# Patient Record
Sex: FEMALE | Race: WHITE | NOT HISPANIC OR LATINO | Employment: UNEMPLOYED | URBAN - METROPOLITAN AREA
[De-identification: names, ages, dates, MRNs, and addresses within clinical notes are randomized per-mention and may not be internally consistent; named-entity substitution may affect disease eponyms.]

---

## 2018-01-13 ENCOUNTER — HOSPITAL ENCOUNTER (OUTPATIENT)
Dept: LAB | Facility: MEDICAL CENTER | Age: 33
End: 2018-01-13
Attending: FAMILY MEDICINE
Payer: MEDICAID

## 2018-01-13 LAB
ALBUMIN SERPL BCP-MCNC: 4.6 G/DL (ref 3.2–4.9)
ALBUMIN/GLOB SERPL: 1.5 G/DL
ALP SERPL-CCNC: 57 U/L (ref 30–99)
ALT SERPL-CCNC: 12 U/L (ref 2–50)
ANION GAP SERPL CALC-SCNC: 8 MMOL/L (ref 0–11.9)
APPEARANCE UR: ABNORMAL
AST SERPL-CCNC: 18 U/L (ref 12–45)
BACTERIA #/AREA URNS HPF: NEGATIVE /HPF
BASOPHILS # BLD AUTO: 0.8 % (ref 0–1.8)
BASOPHILS # BLD: 0.08 K/UL (ref 0–0.12)
BILIRUB SERPL-MCNC: 1 MG/DL (ref 0.1–1.5)
BILIRUB UR QL STRIP.AUTO: NEGATIVE
BUN SERPL-MCNC: 12 MG/DL (ref 8–22)
CALCIUM SERPL-MCNC: 9 MG/DL (ref 8.5–10.5)
CHLORIDE SERPL-SCNC: 103 MMOL/L (ref 96–112)
CHOLEST SERPL-MCNC: 172 MG/DL (ref 100–199)
CO2 SERPL-SCNC: 26 MMOL/L (ref 20–33)
COLOR UR: YELLOW
CREAT SERPL-MCNC: 0.58 MG/DL (ref 0.5–1.4)
EOSINOPHIL # BLD AUTO: 0.41 K/UL (ref 0–0.51)
EOSINOPHIL NFR BLD: 4.2 % (ref 0–6.9)
EPI CELLS #/AREA URNS HPF: ABNORMAL /HPF
ERYTHROCYTE [DISTWIDTH] IN BLOOD BY AUTOMATED COUNT: 45.9 FL (ref 35.9–50)
EST. AVERAGE GLUCOSE BLD GHB EST-MCNC: 105 MG/DL
GLOBULIN SER CALC-MCNC: 3 G/DL (ref 1.9–3.5)
GLUCOSE SERPL-MCNC: 71 MG/DL (ref 65–99)
GLUCOSE UR STRIP.AUTO-MCNC: NEGATIVE MG/DL
HBA1C MFR BLD: 5.3 % (ref 0–5.6)
HCT VFR BLD AUTO: 46.2 % (ref 37–47)
HDLC SERPL-MCNC: 58 MG/DL
HGB BLD-MCNC: 15.5 G/DL (ref 12–16)
HYALINE CASTS #/AREA URNS LPF: ABNORMAL /LPF
IMM GRANULOCYTES # BLD AUTO: 0.03 K/UL (ref 0–0.11)
IMM GRANULOCYTES NFR BLD AUTO: 0.3 % (ref 0–0.9)
KETONES UR STRIP.AUTO-MCNC: NEGATIVE MG/DL
LDLC SERPL CALC-MCNC: 96 MG/DL
LEUKOCYTE ESTERASE UR QL STRIP.AUTO: NEGATIVE
LYMPHOCYTES # BLD AUTO: 2.81 K/UL (ref 1–4.8)
LYMPHOCYTES NFR BLD: 28.6 % (ref 22–41)
MCH RBC QN AUTO: 32.6 PG (ref 27–33)
MCHC RBC AUTO-ENTMCNC: 33.5 G/DL (ref 33.6–35)
MCV RBC AUTO: 97.1 FL (ref 81.4–97.8)
MICRO URNS: ABNORMAL
MONOCYTES # BLD AUTO: 0.54 K/UL (ref 0–0.85)
MONOCYTES NFR BLD AUTO: 5.5 % (ref 0–13.4)
MUCOUS THREADS #/AREA URNS HPF: ABNORMAL /HPF
NEUTROPHILS # BLD AUTO: 5.94 K/UL (ref 2–7.15)
NEUTROPHILS NFR BLD: 60.6 % (ref 44–72)
NITRITE UR QL STRIP.AUTO: NEGATIVE
NRBC # BLD AUTO: 0 K/UL
NRBC BLD-RTO: 0 /100 WBC
PH UR STRIP.AUTO: 5.5 [PH]
PLATELET # BLD AUTO: 388 K/UL (ref 164–446)
PMV BLD AUTO: 10.2 FL (ref 9–12.9)
POTASSIUM SERPL-SCNC: 4 MMOL/L (ref 3.6–5.5)
PROT SERPL-MCNC: 7.6 G/DL (ref 6–8.2)
PROT UR QL STRIP: NEGATIVE MG/DL
RBC # BLD AUTO: 4.76 M/UL (ref 4.2–5.4)
RBC # URNS HPF: ABNORMAL /HPF
RBC UR QL AUTO: NEGATIVE
SODIUM SERPL-SCNC: 137 MMOL/L (ref 135–145)
SP GR UR STRIP.AUTO: 1.04
TRIGL SERPL-MCNC: 90 MG/DL (ref 0–149)
TSH SERPL DL<=0.005 MIU/L-ACNC: 3.24 UIU/ML (ref 0.38–5.33)
UROBILINOGEN UR STRIP.AUTO-MCNC: 0.2 MG/DL
WBC # BLD AUTO: 9.8 K/UL (ref 4.8–10.8)
WBC #/AREA URNS HPF: ABNORMAL /HPF
YEAST BUDDING URNS QL: PRESENT /HPF

## 2018-01-13 PROCEDURE — 80053 COMPREHEN METABOLIC PANEL: CPT

## 2018-01-13 PROCEDURE — 36415 COLL VENOUS BLD VENIPUNCTURE: CPT

## 2018-01-13 PROCEDURE — 80061 LIPID PANEL: CPT

## 2018-01-13 PROCEDURE — 85025 COMPLETE CBC W/AUTO DIFF WBC: CPT

## 2018-01-13 PROCEDURE — 83036 HEMOGLOBIN GLYCOSYLATED A1C: CPT

## 2018-01-13 PROCEDURE — 84443 ASSAY THYROID STIM HORMONE: CPT

## 2018-01-13 PROCEDURE — 81001 URINALYSIS AUTO W/SCOPE: CPT

## 2018-01-29 ENCOUNTER — HOSPITAL ENCOUNTER (EMERGENCY)
Facility: MEDICAL CENTER | Age: 33
End: 2018-01-30
Attending: EMERGENCY MEDICINE
Payer: MEDICAID

## 2018-01-29 ENCOUNTER — APPOINTMENT (OUTPATIENT)
Dept: RADIOLOGY | Facility: MEDICAL CENTER | Age: 33
End: 2018-01-29
Attending: EMERGENCY MEDICINE
Payer: MEDICAID

## 2018-01-29 DIAGNOSIS — F19.10 SUBSTANCE ABUSE (HCC): ICD-10-CM
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
## 2018-01-29 DIAGNOSIS — F25.0 SCHIZOAFFECTIVE DISORDER, BIPOLAR TYPE (HCC): ICD-10-CM

## 2018-01-29 DIAGNOSIS — S41.151A DOG BITE OF MULTIPLE SITES OF RIGHT UPPER ARM, INITIAL ENCOUNTER: ICD-10-CM

## 2018-01-29 PROCEDURE — 73060 X-RAY EXAM OF HUMERUS: CPT | Mod: RT

## 2018-01-29 PROCEDURE — 700111 HCHG RX REV CODE 636 W/ 250 OVERRIDE (IP): Performed by: EMERGENCY MEDICINE

## 2018-01-29 PROCEDURE — 304217 HCHG IRRIGATION SYSTEM

## 2018-01-29 PROCEDURE — A9270 NON-COVERED ITEM OR SERVICE: HCPCS | Performed by: EMERGENCY MEDICINE

## 2018-01-29 PROCEDURE — 700102 HCHG RX REV CODE 250 W/ 637 OVERRIDE(OP): Performed by: EMERGENCY MEDICINE

## 2018-01-29 PROCEDURE — 90715 TDAP VACCINE 7 YRS/> IM: CPT | Performed by: EMERGENCY MEDICINE

## 2018-01-29 PROCEDURE — 90471 IMMUNIZATION ADMIN: CPT

## 2018-01-29 PROCEDURE — 99284 EMERGENCY DEPT VISIT MOD MDM: CPT

## 2018-01-29 RX ORDER — AMOXICILLIN AND CLAVULANATE POTASSIUM 875; 125 MG/1; MG/1
1 TABLET, FILM COATED ORAL 2 TIMES DAILY
Qty: 20 TAB | Refills: 0 | Status: SHIPPED | OUTPATIENT
Start: 2018-01-29 | End: 2018-02-08

## 2018-01-29 RX ORDER — IBUPROFEN 600 MG/1
600 TABLET ORAL ONCE
Status: COMPLETED | OUTPATIENT
Start: 2018-01-29 | End: 2018-01-29

## 2018-01-29 RX ORDER — AMOXICILLIN AND CLAVULANATE POTASSIUM 875; 125 MG/1; MG/1
1 TABLET, FILM COATED ORAL ONCE
Status: COMPLETED | OUTPATIENT
Start: 2018-01-29 | End: 2018-01-29

## 2018-01-29 RX ADMIN — IBUPROFEN 600 MG: 600 TABLET, FILM COATED ORAL at 22:08

## 2018-01-29 RX ADMIN — AMOXICILLIN AND CLAVULANATE POTASSIUM 1 TABLET: 875; 125 TABLET, FILM COATED ORAL at 22:08

## 2018-01-29 RX ADMIN — CLOSTRIDIUM TETANI TOXOID ANTIGEN (FORMALDEHYDE INACTIVATED), CORYNEBACTERIUM DIPHTHERIAE TOXOID ANTIGEN (FORMALDEHYDE INACTIVATED), BORDETELLA PERTUSSIS TOXOID ANTIGEN (GLUTARALDEHYDE INACTIVATED), BORDETELLA PERTUSSIS FILAMENTOUS HEMAGGLUTININ ANTIGEN (FORMALDEHYDE INACTIVATED), BORDETELLA PERTUSSIS PERTACTIN ANTIGEN, AND BORDETELLA PERTUSSIS FIMBRIAE 2/3 ANTIGEN 0.5 ML: 5; 2; 2.5; 5; 3; 5 INJECTION, SUSPENSION INTRAMUSCULAR at 22:07

## 2018-01-30 VITALS
TEMPERATURE: 98.2 F | SYSTOLIC BLOOD PRESSURE: 132 MMHG | WEIGHT: 195 LBS | HEIGHT: 64 IN | HEART RATE: 105 BPM | OXYGEN SATURATION: 97 % | RESPIRATION RATE: 18 BRPM | DIASTOLIC BLOOD PRESSURE: 70 MMHG | BODY MASS INDEX: 33.29 KG/M2

## 2018-01-30 PROCEDURE — 99284 EMERGENCY DEPT VISIT MOD MDM: CPT
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
## 2018-01-30 NOTE — PROGRESS NOTES
Discharge orders received from ERP. No new prescriptions received. Provided education and patient demonstrated understanding. Pt ambulatory off unit. All personal belongings with patient. Pt under custody of [facility].

## 2018-01-30 NOTE — DISCHARGE INSTRUCTIONS
Cleared for incarceration.     Please follow up with a primary physician for blood pressure management, diabetic screening, and all other preventive health concerns.     You were seen and evaluated in the Emergency Department at [facility] for:     Dog bites to arm    You had the following tests and studies:    Xray shows no broken bones or retained dog teeth. Doppler exam showed no evidence of serious blood vessel injury    You received the following medications:    Augmentin, tetanus update, ibuprofen    You received the following prescriptions:    Augmentin, take this until finished or your arm WILL get infected and you'll get very sick.   ----------------------------    Please make sure to follow up with:    A primary care doctor for a recheck in 1-2 days. Return to the ER if the swelling, redness, pain get any worse, or if you get fevers, vomiting, or any new or worse symptoms.     Good luck!  ----------------------------    We always encourage patients to return IMMEDIATELY if they have:  Increased or changing pain, passing out, fevers over 100.4 (taken in your mouth or rectally) for more than 2 days, redness or swelling of skin or tissues, feeling like your heart is beating fast, chest pain that is new or worsening, trouble breathing, feeling like your throat is closing up and can not breath, inability to walk, weakness of any part of your body, new dizziness, severe bleeding that won't stop from any part of your body, if you can't eat or drink, or if you have any other concerns.   If you feel worse, please know that you can always return with any questions, concerns, worse symptoms, or you are feeling unsafe. We certainly cannot say for sure that we have ruled out every illness or dangerous disease, but we feel that at this specific time, your exam, tests, and vital signs like heart rate and blood pressure are safe for discharge.       Animal Bite  An animal bite can result in a scratch on the  skin, deep open cut, puncture of the skin, crush injury, or tearing away of the skin or a body part. Dogs are responsible for most animal bites. Children are bitten more often than adults. An animal bite can range from very mild to more serious. A small bite from your house pet is no cause for alarm. However, some animal bites can become infected or injure a bone or other tissue. You must seek medical care if:  · The skin is broken and bleeding does not slow down or stop after 15 minutes.  · The puncture is deep and difficult to clean (such as a cat bite).  · Pain, warmth, redness, or pus develops around the wound.  · The bite is from a stray animal or rodent. There may be a risk of rabies infection.  · The bite is from a snake, raccoon, skunk, rodriguez, coyote, or bat. There may be a risk of rabies infection.  · The person bitten has a chronic illness such as diabetes, liver disease, or cancer, or the person takes medicine that lowers the immune system.  · There is concern about the location and severity of the bite.  It is important to clean and protect an animal bite wound right away to prevent infection. Follow these steps:  · Clean the wound with plenty of water and soap.  · Apply an antibiotic cream.  · Apply gentle pressure over the wound with a clean towel or gauze to slow or stop bleeding.  · Elevate the affected area above the heart to help stop any bleeding.  · Seek medical care. Getting medical care within 8 hours of the animal bite leads to the best possible outcome.  DIAGNOSIS   Your caregiver will most likely:  · Take a detailed history of the animal and the bite injury.  · Perform a wound exam.  · Take your medical history.  Blood tests or X-rays may be performed. Sometimes, infected bite wounds are cultured and sent to a lab to identify the infectious bacteria.   TREATMENT   Medical treatment will depend on the location and type of animal bite as well as the patient's medical history. Treatment may  include:  · Wound care, such as cleaning and flushing the wound with saline solution, bandaging, and elevating the affected area.  · Antibiotics.  · Tetanus immunization.  · Rabies immunization.  · Leaving the wound open to heal. This is often done with animal bites, due to the high risk of infection. However, in certain cases, wound closure with stitches, wound adhesive, skin adhesive strips, or staples may be used.   Infected bites that are left untreated may require intravenous (IV) antibiotics and surgical treatment in the hospital.  HOME CARE INSTRUCTIONS  · Follow your caregiver's instructions for wound care.  · Take all medicines as directed.  · If your caregiver prescribes antibiotics, take them as directed. Finish them even if you start to feel better.  · Follow up with your caregiver for further exams or immunizations as directed.  You may need a tetanus shot if:  · You cannot remember when you had your last tetanus shot.  · You have never had a tetanus shot.  · The injury broke your skin.  If you get a tetanus shot, your arm may swell, get red, and feel warm to the touch. This is common and not a problem. If you need a tetanus shot and you choose not to have one, there is a rare chance of getting tetanus. Sickness from tetanus can be serious.  SEEK MEDICAL CARE IF:  · You notice warmth, redness, soreness, swelling, pus discharge, or a bad smell coming from the wound.  · You have a red line on the skin coming from the wound.  · You have a fever, chills, or a general ill feeling.  · You have nausea or vomiting.  · You have continued or worsening pain.  · You have trouble moving the injured part.  · You have other questions or concerns.  MAKE SURE YOU:  · Understand these instructions.  · Will watch your condition.  · Will get help right away if you are not doing well or get worse.     This information is not intended to replace advice given to you by your health care provider. Make sure you discuss any  questions you have with your health care provider.     Document Released: 09/04/2012 Document Revised: 03/11/2013 Document Reviewed: 09/04/2012  [employer] Patient Education ©2016 [employer]    Drug Abuse, Frequently Asked Questions  Drug addiction is a complex brain disease. It is characterized by compulsive, at times uncontrollable, drug craving, seeking, and use that persists even in the face of extremely negative results. Drug seeking becomes compulsive, in large part as a result of the effects of prolonged drug use on brain functioning and, thus, on behavior. For many people, drug addiction becomes chronic, with relapses possible even after long periods of being off the drug.  HOW QUICKLY CAN I BECOME ADDICTED TO A DRUG?  There is no easy answer to this. If and how quickly you might become addicted to a drug depends on many factors including the biology of your body. All drugs are potentially harmful and may have life-threatening consequences associated with their use. There are also vast differences among individuals in sensitivity to various drugs. While one person may use a drug many times and suffer no ill effects, another person may be particularly vulnerable and overdose or developing a craving with the first use. There is no way of knowing in advance how someone may react.  HOW DO I KNOW IF SOMEONE IS ADDICTED TO DRUGS?  If a person is compulsively seeking and using a drug despite negative consequences (such as loss of job, debt, physical problems brought on by drug abuse, or family problems) then he or she is probably addicted. Those who screen for drug problems, such as physicians, have developed the CAGE questionnaire. These four simple questions can help detect substance abuse problems:  · Have you ever felt you ought to Cut down on your drinking/drug use?   · Have people ever Annoyed you by criticizing your drinking/drug use?   · Have you ever felt bad or Guilty about your drinking/drug  "use?   · Have you ever had a drink or taken a drug first thing in the morning to steady your nerves or get rid of a hangover ([employer]-opener)?   WHAT ARE THE PHYSICAL SIGNS OF ABUSE OR ADDICTION?  The physical signs of abuse or addiction can vary depending on the person and the drug being abused. For example, someone who abuses marijuana may have a chronic cough or worsening of asthmatic conditions. THC, the chemical in marijuana responsible for producing its effects, is associated with weakening the immune system which makes the user more vulnerable to infections, such as pneumonia. Each drug has short-term and long-term physical effects. Stimulants like cocaine increase heart rate and blood pressure, whereas opioids like heroin may slow the heart rate and reduce breathing (respiration).   ARE THERE EFFECTIVE TREATMENTS FOR DRUG ADDICTION?  Drug addiction can be effectively treated with behavioral-based therapies and, for addiction to some drugs such as heroin or nicotine, medications may be used. Treatment may vary for each person depending on the type of drug(s) being used and multiple courses of treatment may be needed to achieve success. Research has revealed 13 basic principles that underlie effective drug addiction treatment. These are discussed in [employer]'s Principles of Drug Addiction Treatment: A Research-Based Guide.  WHERE CAN I FIND INFORMATION ABOUT DRUG TREATMENT PROGRAMS?  · For referrals to treatment programs, visit the Substance Abuse and Mental Health Services Administration online at http://findtreatment.samhsa.gov/.   · [employer] publishes an expanding series of treatment manuals, the \"clinical toolbox,\" that gives drug treatment providers research-based information for creating effective treatment programs.   WHAT IS DETOXIFICATION, OR \"DETOX\"?  Detoxification is the process of allowing the body to rid itself of a drug while managing the symptoms of withdrawal. It is often the first step in a drug " treatment program and should be followed by treatment with a behavioral-based therapy and/or a medication, if available. Detox alone with no follow-up is not treatment.   WHAT IS WITHDRAWAL? HOW LONG DOES IT LAST?  Withdrawal is the variety of symptoms that occur after use of some addictive drugs is reduced or stopped. Length of withdrawal and symptoms vary with the type of drug. For example, physical symptoms of heroin withdrawal may include restlessness, muscle and bone pain, insomnia, diarrhea, vomiting, and cold flashes. These physical symptoms may last for several days, but the general depression, or dysphoria (opposite of euphoria), that often accompanies heroin withdrawal, may last for weeks. In many cases withdrawal can be easily treated with medications to ease the symptoms. But treating withdrawal is not the same as treating addiction.   WHAT ARE THE COSTS OF DRUG ABUSE TO SOCIETY?  Beyond the raw numbers are other costs to society:  · Spread of infectious diseases such as HIV/AIDS and hepatitis C either through sharing of drug paraphernalia or unprotected sex.   · Deaths due to overdose or other complications from drug use.   · Effects on unborn children of pregnant drug users.   · Other effects such as crime and homelessness.   IF A PREGNANT WOMAN ABUSES DRUGS, DOES IT AFFECT THE FETUS?  · Many substances including alcohol, nicotine, and drugs of abuse can have negative effects on the developing fetus because they are transferred to the fetus across the placenta. For example, nicotine has been connected with premature birth and low birth weight, as has the use of cocaine. Scientific studies have shown that babies born to marijuana users were shorter, weighed less, and had smaller head sizes than those born to mothers who did not use the drug. Smaller babies are more likely to develop health problems.   · Whether a baby's health problems, if caused by a drug, will continue as the child grows, is not  always known. Research does show that children born to mothers who used marijuana regularly during pregnancy may have trouble concentrating, when older. Our research continues to produce insights on the negative effects of drug use on the fetus.   Document Released: 12/20/2004 Document Revised: 03/11/2013 Document Reviewed: 03/19/2010  [employer]® Patient Information ©2013 [employer].

## 2018-07-06 ENCOUNTER — HOSPITAL ENCOUNTER (OUTPATIENT)
Facility: MEDICAL CENTER | Age: 33
End: 2018-07-06
Attending: STUDENT IN AN ORGANIZED HEALTH CARE EDUCATION/TRAINING PROGRAM
Payer: MEDICAID

## 2018-07-06 ENCOUNTER — OFFICE VISIT (OUTPATIENT)
Dept: INTERNAL MEDICINE | Facility: MEDICAL CENTER | Age: 33
End: 2018-07-06
Payer: MEDICAID

## 2018-07-06 ENCOUNTER — HOSPITAL ENCOUNTER (OUTPATIENT)
Dept: LAB | Facility: MEDICAL CENTER | Age: 33
End: 2018-07-06
Attending: STUDENT IN AN ORGANIZED HEALTH CARE EDUCATION/TRAINING PROGRAM
Payer: MEDICAID

## 2018-07-06 ENCOUNTER — TELEPHONE (OUTPATIENT)
Dept: INTERNAL MEDICINE | Facility: MEDICAL CENTER | Age: 33
End: 2018-07-06

## 2018-07-06 VITALS
TEMPERATURE: 98.2 F | DIASTOLIC BLOOD PRESSURE: 86 MMHG | OXYGEN SATURATION: 98 % | BODY MASS INDEX: 30.25 KG/M2 | WEIGHT: 177.2 LBS | HEIGHT: 64 IN | SYSTOLIC BLOOD PRESSURE: 110 MMHG | HEART RATE: 91 BPM

## 2018-07-06 DIAGNOSIS — N30.00 ACUTE CYSTITIS WITHOUT HEMATURIA: ICD-10-CM

## 2018-07-06 LAB
APPEARANCE UR: NORMAL
BILIRUB UR STRIP-MCNC: NORMAL MG/DL
COLOR UR AUTO: NORMAL
GLUCOSE UR STRIP.AUTO-MCNC: NORMAL MG/DL
KETONES UR STRIP.AUTO-MCNC: NORMAL MG/DL
LEUKOCYTE ESTERASE UR QL STRIP.AUTO: NORMAL
NITRITE UR QL STRIP.AUTO: NORMAL
PH UR STRIP.AUTO: 7 [PH] (ref 5–8)
PROT UR QL STRIP: NORMAL MG/DL
RBC UR QL AUTO: NORMAL
SP GR UR STRIP.AUTO: 1.01
UROBILINOGEN UR STRIP-MCNC: NORMAL MG/DL

## 2018-07-06 PROCEDURE — 87591 N.GONORRHOEAE DNA AMP PROB: CPT | Mod: 91

## 2018-07-06 PROCEDURE — 81002 URINALYSIS NONAUTO W/O SCOPE: CPT | Mod: GC | Performed by: INTERNAL MEDICINE

## 2018-07-06 PROCEDURE — 87591 N.GONORRHOEAE DNA AMP PROB: CPT

## 2018-07-06 PROCEDURE — 99000 SPECIMEN HANDLING OFFICE-LAB: CPT | Performed by: INTERNAL MEDICINE

## 2018-07-06 PROCEDURE — 99204 OFFICE O/P NEW MOD 45 MIN: CPT | Mod: GC | Performed by: INTERNAL MEDICINE

## 2018-07-06 PROCEDURE — 87491 CHLMYD TRACH DNA AMP PROBE: CPT | Mod: 91

## 2018-07-06 PROCEDURE — 87491 CHLMYD TRACH DNA AMP PROBE: CPT

## 2018-07-06 RX ORDER — NAPROXEN 25 MG/ML
125 SUSPENSION ORAL 2 TIMES DAILY
Qty: 120 ML | Refills: 1 | Status: SHIPPED
Start: 2018-07-06 | End: 2018-07-08

## 2018-07-06 RX ORDER — ALPRAZOLAM 0.5 MG/1
0.5 TABLET ORAL
COMMUNITY
Start: 2018-04-26 | End: 2023-07-07

## 2018-07-06 RX ORDER — DEXTROAMPHETAMINE SACCHARATE, AMPHETAMINE ASPARTATE, DEXTROAMPHETAMINE SULFATE AND AMPHETAMINE SULFATE 2.5; 2.5; 2.5; 2.5 MG/1; MG/1; MG/1; MG/1
TABLET ORAL
Refills: 0 | COMMUNITY
Start: 2018-06-29 | End: 2023-07-07

## 2018-07-06 RX ORDER — PHENAZOPYRIDINE HYDROCHLORIDE 200 MG/1
200 TABLET, FILM COATED ORAL 3 TIMES DAILY PRN
Qty: 6 TAB | Refills: 0 | Status: SHIPPED
Start: 2018-07-06 | End: 2018-07-09 | Stop reason: SDUPTHER

## 2018-07-06 RX ORDER — ALBUTEROL SULFATE 90 UG/1
2 AEROSOL, METERED RESPIRATORY (INHALATION) EVERY 6 HOURS PRN
Qty: 8.5 G | Refills: 3 | Status: SHIPPED
Start: 2018-07-06 | End: 2023-02-18

## 2018-07-06 RX ORDER — BUPROPION HYDROCHLORIDE 150 MG/1
150 TABLET ORAL EVERY MORNING
Refills: 1 | COMMUNITY
Start: 2018-06-13 | End: 2023-07-07

## 2018-07-06 RX ORDER — ALBUTEROL SULFATE 90 UG/1
2 AEROSOL, METERED RESPIRATORY (INHALATION) EVERY 6 HOURS PRN
COMMUNITY
End: 2018-07-06 | Stop reason: SDUPTHER

## 2018-07-06 RX ORDER — METOCLOPRAMIDE 5 MG/1
5 TABLET ORAL 4 TIMES DAILY
Qty: 120 TAB | Refills: 1 | Status: SHIPPED
Start: 2018-07-06 | End: 2018-07-08

## 2018-07-06 ASSESSMENT — ENCOUNTER SYMPTOMS
DOUBLE VISION: 1
PALPITATIONS: 0
VOMITING: 0
SHORTNESS OF BREATH: 0
HALLUCINATIONS: 0
SORE THROAT: 0
NAUSEA: 0
CONSTIPATION: 0
MYALGIAS: 0
BLOOD IN STOOL: 0
DIAPHORESIS: 0
HEMOPTYSIS: 0
FEVER: 0
WHEEZING: 0
WEAKNESS: 0
CHILLS: 0
ABDOMINAL PAIN: 0
FALLS: 0
NECK PAIN: 0
HEARTBURN: 0
DIARRHEA: 0
EYE DISCHARGE: 0
WEIGHT LOSS: 0
SPUTUM PRODUCTION: 0
EYE PAIN: 0
NERVOUS/ANXIOUS: 1
EYE REDNESS: 0
PHOTOPHOBIA: 0
FLANK PAIN: 0
SINUS PAIN: 0
ORTHOPNEA: 0
CLAUDICATION: 0
PND: 0
BLURRED VISION: 1
BACK PAIN: 0
STRIDOR: 0
COUGH: 0

## 2018-07-06 ASSESSMENT — PATIENT HEALTH QUESTIONNAIRE - PHQ9
5. POOR APPETITE OR OVEREATING: 3 - NEARLY EVERY DAY
CLINICAL INTERPRETATION OF PHQ2 SCORE: 4
SUM OF ALL RESPONSES TO PHQ QUESTIONS 1-9: 18

## 2018-07-06 NOTE — TELEPHONE ENCOUNTER
Pt walked into clinic was given rx for naproxen liquid form pt asking to change to tablet form verbal order from  to call Naproxen 500mg 1 tab po Bid into pharmacy.Rx called in pt was notified.Please update pr's chart
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
## 2018-07-06 NOTE — PATIENT INSTRUCTIONS
Avoid sexual intercourse until the end of treatment      Urinary Tract Infection, Adult  Introduction  A urinary tract infection (UTI) is an infection of any part of the urinary tract. The urinary tract includes the:  · Kidneys.  · Ureters.  · Bladder.  · Urethra.  These organs make, store, and get rid of pee (urine) in the body.  Follow these instructions at home:  · Take over-the-counter and prescription medicines only as told by your doctor.  · If you were prescribed an antibiotic medicine, take it as told by your doctor. Do not stop taking the antibiotic even if you start to feel better.  · Avoid the following drinks:  ¨ Alcohol.  ¨ Caffeine.  ¨ Tea.  ¨ Carbonated drinks.  · Drink enough fluid to keep your pee clear or pale yellow.  · Keep all follow-up visits as told by your doctor. This is important.  · Make sure to:  ¨ Empty your bladder often and completely. Do not to hold pee for long periods of time.  ¨ Empty your bladder before and after sex.  ¨ Wipe from front to back after a bowel movement if you are female. Use each tissue one time when you wipe.  Contact a doctor if:  · You have back pain.  · You have a fever.  · You feel sick to your stomach (nauseous).  · You throw up (vomit).  · Your symptoms do not get better after 3 days.  · Your symptoms go away and then come back.  Get help right away if:  · You have very bad back pain.  · You have very bad lower belly (abdominal) pain.  · You are throwing up and cannot keep down any medicines or water.  This information is not intended to replace advice given to you by your health care provider. Make sure you discuss any questions you have with your health care provider.  Document Released: 06/05/2009 Document Revised: 05/25/2017 Document Reviewed: 11/07/2016  © 2017 [employer]vier

## 2018-07-06 NOTE — PROGRESS NOTES
Pending urine culture results. Adjust medication depending on the sensitivity of the organism.    Either Dr. Franco or Dr. Quarles see the pt on Monday 7/9/2018. I've sent both of them an E-mail regarding the pt.

## 2018-07-07 LAB
C TRACH DNA SPEC QL NAA+PROBE: NEGATIVE
N GONORRHOEA DNA SPEC QL NAA+PROBE: NEGATIVE
SPECIMEN SOURCE: NORMAL

## 2018-07-08 ENCOUNTER — APPOINTMENT (OUTPATIENT)
Dept: RADIOLOGY | Facility: MEDICAL CENTER | Age: 33
End: 2018-07-08
Attending: EMERGENCY MEDICINE
Payer: MEDICAID

## 2018-07-08 ENCOUNTER — HOSPITAL ENCOUNTER (EMERGENCY)
Facility: MEDICAL CENTER | Age: 33
End: 2018-07-08
Attending: EMERGENCY MEDICINE
Payer: MEDICAID

## 2018-07-08 VITALS
WEIGHT: 180.78 LBS | BODY MASS INDEX: 32.03 KG/M2 | HEART RATE: 88 BPM | SYSTOLIC BLOOD PRESSURE: 112 MMHG | OXYGEN SATURATION: 99 % | DIASTOLIC BLOOD PRESSURE: 71 MMHG | HEIGHT: 63 IN | RESPIRATION RATE: 18 BRPM | TEMPERATURE: 98 F

## 2018-07-08 DIAGNOSIS — N12 PYELONEPHRITIS: ICD-10-CM

## 2018-07-08 LAB
ALBUMIN SERPL BCP-MCNC: 4.8 G/DL (ref 3.2–4.9)
ALBUMIN/GLOB SERPL: 1.8 G/DL
ALP SERPL-CCNC: 59 U/L (ref 30–99)
ALT SERPL-CCNC: 18 U/L (ref 2–50)
ANION GAP SERPL CALC-SCNC: 6 MMOL/L (ref 0–11.9)
APPEARANCE UR: CLEAR
AST SERPL-CCNC: 18 U/L (ref 12–45)
BACTERIA #/AREA URNS HPF: ABNORMAL /HPF
BASOPHILS # BLD AUTO: 0.3 % (ref 0–1.8)
BASOPHILS # BLD: 0.03 K/UL (ref 0–0.12)
BILIRUB SERPL-MCNC: 0.8 MG/DL (ref 0.1–1.5)
BILIRUB UR QL STRIP.AUTO: NORMAL
BUN SERPL-MCNC: 8 MG/DL (ref 8–22)
CALCIUM SERPL-MCNC: 9.1 MG/DL (ref 8.4–10.2)
CHLORIDE SERPL-SCNC: 106 MMOL/L (ref 96–112)
CO2 SERPL-SCNC: 27 MMOL/L (ref 20–33)
COLOR UR: NORMAL
CREAT SERPL-MCNC: 0.68 MG/DL (ref 0.5–1.4)
EOSINOPHIL # BLD AUTO: 0.01 K/UL (ref 0–0.51)
EOSINOPHIL NFR BLD: 0.1 % (ref 0–6.9)
EPI CELLS #/AREA URNS HPF: ABNORMAL /HPF
ERYTHROCYTE [DISTWIDTH] IN BLOOD BY AUTOMATED COUNT: 45 FL (ref 35.9–50)
GLOBULIN SER CALC-MCNC: 2.6 G/DL (ref 1.9–3.5)
GLUCOSE SERPL-MCNC: 78 MG/DL (ref 65–99)
GLUCOSE UR STRIP.AUTO-MCNC: NORMAL MG/DL
HCG UR QL: NEGATIVE
HCT VFR BLD AUTO: 43.5 % (ref 37–47)
HGB BLD-MCNC: 14.5 G/DL (ref 12–16)
IMM GRANULOCYTES # BLD AUTO: 0.04 K/UL (ref 0–0.11)
IMM GRANULOCYTES NFR BLD AUTO: 0.4 % (ref 0–0.9)
KETONES UR STRIP.AUTO-MCNC: NORMAL MG/DL
LEUKOCYTE ESTERASE UR QL STRIP.AUTO: NORMAL
LYMPHOCYTES # BLD AUTO: 2.59 K/UL (ref 1–4.8)
LYMPHOCYTES NFR BLD: 22.7 % (ref 22–41)
MCH RBC QN AUTO: 32.7 PG (ref 27–33)
MCHC RBC AUTO-ENTMCNC: 33.3 G/DL (ref 33.6–35)
MCV RBC AUTO: 98.2 FL (ref 81.4–97.8)
MICRO URNS: NORMAL
MONOCYTES # BLD AUTO: 0.66 K/UL (ref 0–0.85)
MONOCYTES NFR BLD AUTO: 5.8 % (ref 0–13.4)
MUCOUS THREADS #/AREA URNS HPF: ABNORMAL /HPF
NEUTROPHILS # BLD AUTO: 8.09 K/UL (ref 2–7.15)
NEUTROPHILS NFR BLD: 70.7 % (ref 44–72)
NITRITE UR QL STRIP.AUTO: NORMAL
NRBC # BLD AUTO: 0 K/UL
NRBC BLD-RTO: 0 /100 WBC
PH UR STRIP.AUTO: 7 [PH]
PLATELET # BLD AUTO: 348 K/UL (ref 164–446)
PMV BLD AUTO: 9.3 FL (ref 9–12.9)
POTASSIUM SERPL-SCNC: 3.7 MMOL/L (ref 3.6–5.5)
PROT SERPL-MCNC: 7.4 G/DL (ref 6–8.2)
PROT UR QL STRIP: NORMAL MG/DL
RBC # BLD AUTO: 4.43 M/UL (ref 4.2–5.4)
RBC # URNS HPF: ABNORMAL /HPF
RBC UR QL AUTO: NORMAL
SODIUM SERPL-SCNC: 139 MMOL/L (ref 135–145)
SP GR UR REFRACTOMETRY: 1.02
SP GR UR STRIP.AUTO: 1.02
WBC # BLD AUTO: 11.4 K/UL (ref 4.8–10.8)
WBC #/AREA URNS HPF: ABNORMAL /HPF

## 2018-07-08 PROCEDURE — 700117 HCHG RX CONTRAST REV CODE 255: Performed by: EMERGENCY MEDICINE

## 2018-07-08 PROCEDURE — 96376 TX/PRO/DX INJ SAME DRUG ADON: CPT

## 2018-07-08 PROCEDURE — 99285 EMERGENCY DEPT VISIT HI MDM: CPT

## 2018-07-08 PROCEDURE — 700111 HCHG RX REV CODE 636 W/ 250 OVERRIDE (IP): Performed by: EMERGENCY MEDICINE

## 2018-07-08 PROCEDURE — 87077 CULTURE AEROBIC IDENTIFY: CPT

## 2018-07-08 PROCEDURE — 74177 CT ABD & PELVIS W/CONTRAST: CPT

## 2018-07-08 PROCEDURE — 700105 HCHG RX REV CODE 258: Performed by: EMERGENCY MEDICINE

## 2018-07-08 PROCEDURE — 80053 COMPREHEN METABOLIC PANEL: CPT

## 2018-07-08 PROCEDURE — 87086 URINE CULTURE/COLONY COUNT: CPT

## 2018-07-08 PROCEDURE — 36415 COLL VENOUS BLD VENIPUNCTURE: CPT

## 2018-07-08 PROCEDURE — 85025 COMPLETE CBC W/AUTO DIFF WBC: CPT

## 2018-07-08 PROCEDURE — 96365 THER/PROPH/DIAG IV INF INIT: CPT

## 2018-07-08 PROCEDURE — 81001 URINALYSIS AUTO W/SCOPE: CPT

## 2018-07-08 PROCEDURE — 87186 SC STD MICRODIL/AGAR DIL: CPT

## 2018-07-08 PROCEDURE — 96375 TX/PRO/DX INJ NEW DRUG ADDON: CPT

## 2018-07-08 PROCEDURE — 81025 URINE PREGNANCY TEST: CPT

## 2018-07-08 RX ORDER — MORPHINE SULFATE 4 MG/ML
4 INJECTION, SOLUTION INTRAMUSCULAR; INTRAVENOUS ONCE
Status: COMPLETED | OUTPATIENT
Start: 2018-07-08 | End: 2018-07-08

## 2018-07-08 RX ORDER — HYDROCODONE BITARTRATE AND ACETAMINOPHEN 5; 325 MG/1; MG/1
1-2 TABLET ORAL EVERY 4 HOURS PRN
Qty: 10 TAB | Refills: 0 | Status: SHIPPED | OUTPATIENT
Start: 2018-07-08 | End: 2018-07-10

## 2018-07-08 RX ORDER — SODIUM CHLORIDE 9 MG/ML
1000 INJECTION, SOLUTION INTRAVENOUS ONCE
Status: COMPLETED | OUTPATIENT
Start: 2018-07-08 | End: 2018-07-08

## 2018-07-08 RX ORDER — CEFDINIR 300 MG/1
300 CAPSULE ORAL 2 TIMES DAILY
Qty: 20 CAP | Refills: 0 | Status: SHIPPED | OUTPATIENT
Start: 2018-07-08 | End: 2019-04-19

## 2018-07-08 RX ORDER — ONDANSETRON 2 MG/ML
4 INJECTION INTRAMUSCULAR; INTRAVENOUS ONCE
Status: COMPLETED | OUTPATIENT
Start: 2018-07-08 | End: 2018-07-08

## 2018-07-08 RX ORDER — NAPROXEN 500 MG/1
500 TABLET ORAL 2 TIMES DAILY WITH MEALS
Status: SHIPPED | COMMUNITY
End: 2023-07-07

## 2018-07-08 RX ORDER — ONDANSETRON 4 MG/1
4 TABLET, ORALLY DISINTEGRATING ORAL EVERY 6 HOURS PRN
Qty: 8 TAB | Refills: 0 | Status: SHIPPED | OUTPATIENT
Start: 2018-07-08 | End: 2019-04-19

## 2018-07-08 RX ORDER — CEFTRIAXONE 1 G/1
1 INJECTION, POWDER, FOR SOLUTION INTRAMUSCULAR; INTRAVENOUS ONCE
Status: COMPLETED | OUTPATIENT
Start: 2018-07-08 | End: 2018-07-08

## 2018-07-08 RX ADMIN — MORPHINE SULFATE 4 MG: 4 INJECTION INTRAVENOUS at 16:03

## 2018-07-08 RX ADMIN — ONDANSETRON 4 MG: 2 INJECTION, SOLUTION INTRAMUSCULAR; INTRAVENOUS at 16:03

## 2018-07-08 RX ADMIN — SODIUM CHLORIDE 1000 ML: 9 INJECTION, SOLUTION INTRAVENOUS at 15:34

## 2018-07-08 RX ADMIN — IOHEXOL 100 ML: 350 INJECTION, SOLUTION INTRAVENOUS at 19:28

## 2018-07-08 RX ADMIN — CEFTRIAXONE 1 G: 1 INJECTION, POWDER, FOR SOLUTION INTRAMUSCULAR; INTRAVENOUS at 17:50

## 2018-07-08 RX ADMIN — MORPHINE SULFATE 4 MG: 4 INJECTION INTRAVENOUS at 18:39

## 2018-07-08 RX ADMIN — ONDANSETRON 4 MG: 2 INJECTION, SOLUTION INTRAMUSCULAR; INTRAVENOUS at 18:40

## 2018-07-08 ASSESSMENT — PAIN SCALES - GENERAL: PAINLEVEL_OUTOF10: 3
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
## 2018-07-08 NOTE — ED NOTES
C/O worsening dysuria for the past 2 days, and on antibiotic treatment.  She presents with a dx of UTI established in a Urgent Care.

## 2018-07-08 NOTE — ED NOTES
Pt has had dysuria for about 5 days. Was seen at a PCP on Friday and given abx. Pt still experiencing symptoms and now her LUQ and Left back is painful.

## 2018-07-08 NOTE — ED NOTES
Med Rec completed per patient with medication bottles at bedside  (returned)  Allergies reviewed  No ORAL antibiotics in last 30 days    Patient had a Kenalog shot about 2 weeks ago

## 2018-07-08 NOTE — LETTER
7/11/2018               Yane Orellana  [address] [address]        Dear Yane (MR#[id number])    This letter is sent in regards to your, recent visit to the [facility] Emergency Department on 7/8/2018.  During the visit, tests were performed to assist the physician in a medical diagnosis.  A review of those tests requires that we notify you of the following:    Your urine culture was POSITIVE for a bacteria called Escherichia coli. The antibiotic prescribed for you (cefdinir) should be active to treat this bacteria. IT IS IMPORTANT THAT YOU CONTINUE TAKING YOUR ANTIBIOTIC UNTIL IT IS FINISHED.      Please feel free to contact me at the number below if you have any questions or concerns. Thank you for your cooperation in the matter.    Sincerely,  ED Culture Follow-Up Staff  Mariia Irving, PharmD    [facility], Emergency Department  [address] [place] [id number]  [phone] (ED Culture Line)  [phone]

## 2018-07-09 ENCOUNTER — PATIENT OUTREACH (OUTPATIENT)
Dept: HEALTH INFORMATION MANAGEMENT | Facility: OTHER | Age: 33
End: 2018-07-09

## 2018-07-09 ENCOUNTER — OFFICE VISIT (OUTPATIENT)
Dept: INTERNAL MEDICINE | Facility: MEDICAL CENTER | Age: 33
End: 2018-07-09
Payer: MEDICAID

## 2018-07-09 VITALS
HEART RATE: 80 BPM | WEIGHT: 185.4 LBS | TEMPERATURE: 97.7 F | RESPIRATION RATE: 18 BRPM | OXYGEN SATURATION: 95 % | HEIGHT: 64 IN | BODY MASS INDEX: 31.65 KG/M2

## 2018-07-09 DIAGNOSIS — N30.00 ACUTE CYSTITIS WITHOUT HEMATURIA: ICD-10-CM

## 2018-07-09 DIAGNOSIS — G43.009 MIGRAINE WITHOUT AURA AND WITHOUT STATUS MIGRAINOSUS, NOT INTRACTABLE: ICD-10-CM

## 2018-07-09 DIAGNOSIS — R30.0 DYSURIA: ICD-10-CM

## 2018-07-09 PROCEDURE — 99214 OFFICE O/P EST MOD 30 MIN: CPT | Mod: GC | Performed by: INTERNAL MEDICINE

## 2018-07-09 RX ORDER — PHENAZOPYRIDINE HYDROCHLORIDE 200 MG/1
200 TABLET, FILM COATED ORAL 3 TIMES DAILY PRN
Qty: 15 TAB | Refills: 0 | Status: SHIPPED | OUTPATIENT
Start: 2018-07-09 | End: 2018-07-09 | Stop reason: SDUPTHER

## 2018-07-09 RX ORDER — PHENAZOPYRIDINE HYDROCHLORIDE 200 MG/1
200 TABLET, FILM COATED ORAL 3 TIMES DAILY PRN
Qty: 6 TAB | Refills: 0 | Status: SHIPPED | OUTPATIENT
Start: 2018-07-09 | End: 2018-07-09 | Stop reason: SDUPTHER

## 2018-07-09 RX ORDER — SUMATRIPTAN 25 MG/1
25-100 TABLET, FILM COATED ORAL
Qty: 15 TAB | Refills: 3 | Status: SHIPPED | OUTPATIENT
Start: 2018-07-09 | End: 2018-07-09 | Stop reason: SDUPTHER

## 2018-07-09 RX ORDER — SUMATRIPTAN 25 MG/1
25-100 TABLET, FILM COATED ORAL
Qty: 15 TAB | Refills: 3 | Status: SHIPPED | OUTPATIENT
Start: 2018-07-09 | End: 2018-07-10 | Stop reason: SDUPTHER

## 2018-07-09 RX ORDER — PROPRANOLOL HCL 60 MG
60 CAPSULE, EXTENDED RELEASE 24HR ORAL DAILY
Qty: 30 CAP | Refills: 3 | Status: SHIPPED | OUTPATIENT
Start: 2018-07-09 | End: 2018-07-10 | Stop reason: SDUPTHER

## 2018-07-09 RX ORDER — PHENAZOPYRIDINE HYDROCHLORIDE 200 MG/1
200 TABLET, FILM COATED ORAL 3 TIMES DAILY PRN
Qty: 6 TAB | Refills: 0 | Status: SHIPPED | OUTPATIENT
Start: 2018-07-09 | End: 2023-07-07

## 2018-07-09 RX ORDER — PROPRANOLOL HCL 60 MG
60 CAPSULE, EXTENDED RELEASE 24HR ORAL DAILY
Qty: 30 CAP | Refills: 3 | Status: SHIPPED | OUTPATIENT
Start: 2018-07-09 | End: 2018-07-09 | Stop reason: SDUPTHER

## 2018-07-09 ASSESSMENT — PAIN SCALES - GENERAL: PAINLEVEL: 7=MODERATE-SEVERE PAIN

## 2018-07-09 NOTE — PROGRESS NOTES
Placed discharge outreach phone call to patient s/p ER discharge 7/8/18.  Left voicemail providing my contact information and instructions to call with any questions or concerns.
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
## 2018-07-09 NOTE — ED NOTES
.Pt D/C to home. VSS. IV removed. D/C instructions and 2 prescriptions given to patient. Pt verbalizes understanding. Pt leaves ED with no acute changes, complaints or concerns. Pt ambulated out with a steady gait and all belongings.

## 2018-07-09 NOTE — TELEPHONE ENCOUNTER
1. Caller Name: mom                      Call Back Number: [phone] (home)       2. Message: patient was seen this morning and [facility] is having a problem filling Medicaid due to provider not contracted with Medicaid. Patient's mom would like prescription sent to [facility] so they can  before heading home to [place].       3. Patient approves office to leave a detailed voicemail/MyChart message: N\A

## 2018-07-10 ENCOUNTER — TELEPHONE (OUTPATIENT)
Dept: INTERNAL MEDICINE | Facility: MEDICAL CENTER | Age: 33
End: 2018-07-10

## 2018-07-10 DIAGNOSIS — G43.009 MIGRAINE WITHOUT AURA AND WITHOUT STATUS MIGRAINOSUS, NOT INTRACTABLE: ICD-10-CM

## 2018-07-10 LAB
BACTERIA UR CULT: ABNORMAL
BACTERIA UR CULT: ABNORMAL
SIGNIFICANT IND 70042: ABNORMAL
SITE SITE: ABNORMAL
SOURCE SOURCE: ABNORMAL

## 2018-07-10 RX ORDER — PROPRANOLOL HCL 60 MG
60 CAPSULE, EXTENDED RELEASE 24HR ORAL DAILY
Qty: 30 CAP | Refills: 3 | Status: SHIPPED | OUTPATIENT
Start: 2018-07-10 | End: 2023-07-07

## 2018-07-10 RX ORDER — SUMATRIPTAN 25 MG/1
25 TABLET, FILM COATED ORAL
Qty: 15 TAB | Refills: 3 | Status: SHIPPED | OUTPATIENT
Start: 2018-07-10 | End: 2018-08-09

## 2018-07-11 NOTE — ED NOTES
ED Positive Culture Follow-up/Notification Note:    Date: 7/11/18     Patient seen in the ED on 7/8/2018 for increased dysuria and urinary frequency. Has mild right flank pain.  1. Pyelonephritis     Rocephin 1 g IV in the ER.    Discharge Medication List as of 7/8/2018  8:42 PM      START taking these medications    Details   cefdinir (OMNICEF) 300 MG Cap Take 1 Cap by mouth 2 times a day., Disp-20 Cap, R-0, Normal      HYDROcodone-acetaminophen (NORCO) 5-325 MG Tab per tablet Take 1-2 Tabs by mouth every four hours as needed for up to 2 days., Disp-10 Tab, R-0, Print Rx Paper, For 2 days      ondansetron (ZOFRAN ODT) 4 MG TABLET DISPERSIBLE Take 1 Tab by mouth every 6 hours as needed for Nausea., Disp-8 Tab, R-0, Print Rx Paper             Allergies: Geodon [ziprasidone hydrochloride]     Vitals:    07/08/18 1906 07/08/18 1936 07/08/18 1951 07/08/18 2006   BP:       Pulse: 73 91 94 88   Resp:       Temp:       SpO2: 95% 99% 97% 99%   Weight:       Height:           Final cultures:   Results     Procedure Component Value Units Date/Time    URINE CULTURE [[id number]]  (Abnormal)  (Susceptibility) Collected:  07/08/18 1445    Order Status:  Completed Specimen:  Urine Updated:  07/10/18 1055     Significant Indicator POS (POS)     Source UR     Site --     Urine Culture -- (A)      Escherichia coli  10-50,000 cfu/mL   (A)    Narrative:       Indication for culture:->Dysuria/Frequency/Burning    Culture & Susceptibility     ESCHERICHIA COLI     Antibiotic Sensitivity Microscan Unit Status    Ampicillin Sensitive <=8 mcg/mL Final    Method: SENSITIVITY, KEVIN    Cefepime Sensitive <=8 mcg/mL Final    Method: SENSITIVITY, KEVIN    Cefotaxime Sensitive <=2 mcg/mL Final    Method: SENSITIVITY, KEVIN    Cefotetan Sensitive <=16 mcg/mL Final    Method: SENSITIVITY, KEVIN    Ceftazidime Sensitive <=1 mcg/mL Final    Method: SENSITIVITY, KEVIN    Ceftriaxone Sensitive <=8 mcg/mL Final    Method: SENSITIVITY, KEVIN    Cefuroxime Sensitive 8  mcg/mL Final    Method: SENSITIVITY, KEVIN    Cephalothin Sensitive <=8 mcg/mL Final    Method: SENSITIVITY, KEVIN    Ciprofloxacin Sensitive <=1 mcg/mL Final    Method: SENSITIVITY, KEVIN    Gentamicin Sensitive <=4 mcg/mL Final    Method: SENSITIVITY, KEVIN    Levofloxacin Sensitive <=2 mcg/mL Final    Method: SENSITIVITY, KEVIN    Nitrofurantoin Sensitive <=32 mcg/mL Final    Method: SENSITIVITY, KEVIN    Pip/Tazobactam Sensitive <=16 mcg/mL Final    Method: SENSITIVITY, KEVIN    Piperacillin Sensitive <=16 mcg/mL Final    Method: SENSITIVITY, KEVIN    Tigecycline Sensitive <=2 mcg/mL Final    Method: SENSITIVITY, KEVIN    Tobramycin Sensitive <=4 mcg/mL Final    Method: SENSITIVITY, KEVIN    Trimeth/Sulfa Sensitive <=2/38 mcg/mL Final    Method: SENSITIVITY, KEVIN                       URINALYSIS (UA) [[id number]] Collected:  07/08/18 1445    Order Status:  Completed Specimen:  Blood Updated:  07/08/18 1521     Micro Urine Req Microscopic     Color Orange     Character Clear     Specific Gravity 1.020     Ph 7.0     Glucose See comment mg/dL      Comment: Unable to determine test result due to color interference  from the urine.          Ketones See comment mg/dL      Comment: Unable to determine test result due to color interference  from the urine.          Protein See comment mg/dL      Comment: Unable to determine test result due to color interference  from the urine.          Bilirubin See comment     Comment: Unable to determine test result due to color interference  from the urine.          Nitrite See comment     Comment: Unable to determine test result due to color interference  from the urine.          Leukocyte Esterase See comment     Comment: Unable to determine test result due to color interference  from the urine.          Occult Blood See comment     Comment: Unable to determine test result due to color interference  from the urine.         Narrative:       Indication for culture:->Dysuria/Frequency/Burning     URINALYSIS CULTURE, IF INDICATED [[id number]] Collected:  07/08/18 0000    Order Status:  Canceled Specimen:  Urine           Plan:   Appropriate antibiotic therapy prescribed. No changes required based upon culture result.  Sent letter to patient to notify of positive culture result and encourage compliance with prescribed antibiotics.     Mariia Irving

## 2018-07-17 ENCOUNTER — TELEPHONE (OUTPATIENT)
Dept: INTERNAL MEDICINE | Facility: MEDICAL CENTER | Age: 33
End: 2018-07-17

## 2018-07-17 NOTE — TELEPHONE ENCOUNTER
[facility] lab called to let us know that the  didn't get the urine culture specimen to the lab in enough time to be tested without refrigeration because of the weekend so they cancelled test. Does the pt need to be re-collected?   I've tried to reach her to ask how she's doing, but her voicemail is always full and can't accept messages.

## 2018-07-26 NOTE — TELEPHONE ENCOUNTER
I've tried to reach this pt several times, the outgoing message is for a Antoinette and VM is always full. I'm not sure that this is her correct phone and I'm taking out of our demographics so that it can be updated at next visit.

## 2018-07-26 NOTE — TELEPHONE ENCOUNTER
Oscar Hernandez M.D.  Shana Robert, Med Ass't             If we can reach her, I'm not sure what else we can do. Cefdinir we prescribed should be enough. I believe she has another appointment in a week. We can talk to her then.     Thank you

## 2019-04-18 ENCOUNTER — HOSPITAL ENCOUNTER (EMERGENCY)
Facility: MEDICAL CENTER | Age: 34
End: 2019-04-19
Attending: EMERGENCY MEDICINE

## 2019-04-18 DIAGNOSIS — N30.90 CYSTITIS: ICD-10-CM

## 2019-04-18 LAB
HCG UR QL: NEGATIVE
SP GR UR REFRACTOMETRY: 1.02

## 2019-04-18 PROCEDURE — 99284 EMERGENCY DEPT VISIT MOD MDM: CPT

## 2019-04-18 PROCEDURE — 36415 COLL VENOUS BLD VENIPUNCTURE: CPT

## 2019-04-18 PROCEDURE — 87591 N.GONORRHOEAE DNA AMP PROB: CPT

## 2019-04-18 PROCEDURE — 81025 URINE PREGNANCY TEST: CPT

## 2019-04-18 PROCEDURE — 87491 CHLMYD TRACH DNA AMP PROBE: CPT

## 2019-04-18 PROCEDURE — 81001 URINALYSIS AUTO W/SCOPE: CPT

## 2019-04-18 PROCEDURE — 85025 COMPLETE CBC W/AUTO DIFF WBC: CPT

## 2019-04-18 PROCEDURE — 96374 THER/PROPH/DIAG INJ IV PUSH: CPT

## 2019-04-18 PROCEDURE — 80053 COMPREHEN METABOLIC PANEL: CPT

## 2019-04-18 RX ORDER — GABAPENTIN 300 MG/1
300 CAPSULE ORAL 3 TIMES DAILY
Status: SHIPPED | COMMUNITY
End: 2023-07-07

## 2019-04-18 ASSESSMENT — LIFESTYLE VARIABLES
ON A TYPICAL DAY WHEN YOU DRINK ALCOHOL HOW MANY DRINKS DO YOU HAVE: 2
DO YOU DRINK ALCOHOL: YES
AVERAGE NUMBER OF DAYS PER WEEK YOU HAVE A DRINK CONTAINING ALCOHOL: 1
HOW MANY TIMES IN THE PAST YEAR HAVE YOU HAD 5 OR MORE DRINKS IN A DAY: 0
TOTAL SCORE: 0
HAVE YOU EVER FELT YOU SHOULD CUT DOWN ON YOUR DRINKING: NO
EVER HAD A DRINK FIRST THING IN THE MORNING TO STEADY YOUR NERVES TO GET RID OF A HANGOVER: NO
TOTAL SCORE: 0
HAVE PEOPLE ANNOYED YOU BY CRITICIZING YOUR DRINKING: NO
CONSUMPTION TOTAL: NEGATIVE
TOTAL SCORE: 0
EVER FELT BAD OR GUILTY ABOUT YOUR DRINKING: NO

## 2019-04-18 ASSESSMENT — PAIN DESCRIPTION - DESCRIPTORS: DESCRIPTORS: TENDER

## 2019-04-19 VITALS
WEIGHT: 204.59 LBS | HEIGHT: 64 IN | TEMPERATURE: 98.1 F | HEART RATE: 74 BPM | SYSTOLIC BLOOD PRESSURE: 136 MMHG | BODY MASS INDEX: 34.93 KG/M2 | OXYGEN SATURATION: 100 % | RESPIRATION RATE: 18 BRPM | DIASTOLIC BLOOD PRESSURE: 66 MMHG

## 2019-04-19 LAB
ALBUMIN SERPL BCP-MCNC: 4.1 G/DL (ref 3.2–4.9)
ALBUMIN/GLOB SERPL: 1.4 G/DL
ALP SERPL-CCNC: 66 U/L (ref 30–99)
ALT SERPL-CCNC: 11 U/L (ref 2–50)
ANION GAP SERPL CALC-SCNC: 7 MMOL/L (ref 0–11.9)
APPEARANCE UR: ABNORMAL
AST SERPL-CCNC: 16 U/L (ref 12–45)
BACTERIA #/AREA URNS HPF: ABNORMAL /HPF
BASOPHILS # BLD AUTO: 0.4 % (ref 0–1.8)
BASOPHILS # BLD: 0.04 K/UL (ref 0–0.12)
BILIRUB SERPL-MCNC: 0.7 MG/DL (ref 0.1–1.5)
BILIRUB UR QL STRIP.AUTO: NEGATIVE
BUN SERPL-MCNC: 8 MG/DL (ref 8–22)
C TRACH DNA SPEC QL NAA+PROBE: NEGATIVE
CALCIUM SERPL-MCNC: 9.6 MG/DL (ref 8.5–10.5)
CHLORIDE SERPL-SCNC: 105 MMOL/L (ref 96–112)
CO2 SERPL-SCNC: 24 MMOL/L (ref 20–33)
COLOR UR: YELLOW
COMMENT 1642: NORMAL
CREAT SERPL-MCNC: 0.57 MG/DL (ref 0.5–1.4)
EOSINOPHIL # BLD AUTO: 0.02 K/UL (ref 0–0.51)
EOSINOPHIL NFR BLD: 0.2 % (ref 0–6.9)
EPI CELLS #/AREA URNS HPF: ABNORMAL /HPF
ERYTHROCYTE [DISTWIDTH] IN BLOOD BY AUTOMATED COUNT: 44.5 FL (ref 35.9–50)
GLOBULIN SER CALC-MCNC: 2.9 G/DL (ref 1.9–3.5)
GLUCOSE SERPL-MCNC: 84 MG/DL (ref 65–99)
GLUCOSE UR STRIP.AUTO-MCNC: NEGATIVE MG/DL
HCT VFR BLD AUTO: 42.2 % (ref 37–47)
HGB BLD-MCNC: 14 G/DL (ref 12–16)
IMM GRANULOCYTES # BLD AUTO: 0.03 K/UL (ref 0–0.11)
IMM GRANULOCYTES NFR BLD AUTO: 0.3 % (ref 0–0.9)
KETONES UR STRIP.AUTO-MCNC: ABNORMAL MG/DL
LEUKOCYTE ESTERASE UR QL STRIP.AUTO: ABNORMAL
LYMPHOCYTES # BLD AUTO: 3.3 K/UL (ref 1–4.8)
LYMPHOCYTES NFR BLD: 34.2 % (ref 22–41)
MCH RBC QN AUTO: 31.7 PG (ref 27–33)
MCHC RBC AUTO-ENTMCNC: 33.2 G/DL (ref 33.6–35)
MCV RBC AUTO: 95.7 FL (ref 81.4–97.8)
MICRO URNS: ABNORMAL
MONOCYTES # BLD AUTO: 0.52 K/UL (ref 0–0.85)
MONOCYTES NFR BLD AUTO: 5.4 % (ref 0–13.4)
MORPHOLOGY BLD-IMP: NORMAL
MUCOUS THREADS #/AREA URNS HPF: ABNORMAL /HPF
N GONORRHOEA DNA SPEC QL NAA+PROBE: NEGATIVE
NEUTROPHILS # BLD AUTO: 5.75 K/UL (ref 2–7.15)
NEUTROPHILS NFR BLD: 59.5 % (ref 44–72)
NITRITE UR QL STRIP.AUTO: NEGATIVE
NRBC # BLD AUTO: 0 K/UL
NRBC BLD-RTO: 0 /100 WBC
PH UR STRIP.AUTO: 6 [PH]
PLATELET # BLD AUTO: 243 K/UL (ref 164–446)
PLATELET BLD QL SMEAR: NORMAL
PMV BLD AUTO: 9.6 FL (ref 9–12.9)
POTASSIUM SERPL-SCNC: 4.3 MMOL/L (ref 3.6–5.5)
PROT SERPL-MCNC: 7 G/DL (ref 6–8.2)
PROT UR QL STRIP: NEGATIVE MG/DL
RBC # BLD AUTO: 4.41 M/UL (ref 4.2–5.4)
RBC # URNS HPF: ABNORMAL /HPF
RBC BLD AUTO: NORMAL
RBC UR QL AUTO: NEGATIVE
SODIUM SERPL-SCNC: 136 MMOL/L (ref 135–145)
SP GR UR STRIP.AUTO: 1.02
SPECIMEN SOURCE: NORMAL
UROBILINOGEN UR STRIP.AUTO-MCNC: 1 MG/DL
WBC # BLD AUTO: 9.7 K/UL (ref 4.8–10.8)
WBC #/AREA URNS HPF: ABNORMAL /HPF

## 2019-04-19 PROCEDURE — A9270 NON-COVERED ITEM OR SERVICE: HCPCS | Performed by: EMERGENCY MEDICINE

## 2019-04-19 PROCEDURE — 700111 HCHG RX REV CODE 636 W/ 250 OVERRIDE (IP)

## 2019-04-19 PROCEDURE — 700102 HCHG RX REV CODE 250 W/ 637 OVERRIDE(OP): Performed by: EMERGENCY MEDICINE

## 2019-04-19 PROCEDURE — 96374 THER/PROPH/DIAG INJ IV PUSH: CPT

## 2019-04-19 RX ORDER — ONDANSETRON 4 MG/1
4 TABLET, ORALLY DISINTEGRATING ORAL EVERY 6 HOURS PRN
Qty: 10 TAB | Refills: 0 | Status: SHIPPED | OUTPATIENT
Start: 2019-04-19 | End: 2023-07-07

## 2019-04-19 RX ORDER — CEFDINIR 300 MG/1
300 CAPSULE ORAL ONCE
Status: COMPLETED | OUTPATIENT
Start: 2019-04-19 | End: 2019-04-19

## 2019-04-19 RX ORDER — ONDANSETRON 2 MG/ML
4 INJECTION INTRAMUSCULAR; INTRAVENOUS ONCE
Status: COMPLETED | OUTPATIENT
Start: 2019-04-19 | End: 2019-04-19

## 2019-04-19 RX ORDER — CEFDINIR 300 MG/1
300 CAPSULE ORAL 2 TIMES DAILY
Qty: 20 CAP | Refills: 0 | Status: SHIPPED | OUTPATIENT
Start: 2019-04-19 | End: 2023-03-05

## 2019-04-19 RX ADMIN — CEFDINIR 300 MG: 300 CAPSULE ORAL at 01:15

## 2019-04-19 RX ADMIN — ONDANSETRON 4 MG: 2 INJECTION INTRAMUSCULAR; INTRAVENOUS at 01:15

## 2019-04-19 ASSESSMENT — ENCOUNTER SYMPTOMS
DIARRHEA: 0
MYALGIAS: 0
HEADACHES: 0
COUGH: 0
NAUSEA: 1
CONSTIPATION: 0
PALPITATIONS: 0
HEMOPTYSIS: 0
DIZZINESS: 0
ABDOMINAL PAIN: 0
VOMITING: 1

## 2019-04-19 NOTE — ED NOTES
Discharge instructions and prescriptions given to pt, reviewed, all questions answered. Verbalizes understanding of follow up and to complete antibiotic course. VSS. PIV dc'd, dressing in place. Ambulatory to ED exit with spouse, steady on feet.    no

## 2019-04-19 NOTE — ED TRIAGE NOTES
"Chief Complaint   Patient presents with   • Flank Pain     right sided for about 2 weeks. Pt has had kidney infections in the past. + Dysuria.   • N/V     Started about 4 days ago when taking care of sick spouse with same sx. N/V and fevers. Afibril here.      /83   Pulse 87   Temp 36.3 °C (97.4 °F) (Temporal)   Resp 16   Ht 1.626 m (5' 4\")   Wt 92.8 kg (204 lb 9.4 oz)   SpO2 99%   BMI 35.12 kg/m²   Pt placed back in lobby, educated on triage process, and told to inform staff of any change in condition.     "

## 2019-04-19 NOTE — ED PROVIDER NOTES
ED Provider Note    CHIEF COMPLAINT  Chief Complaint   Patient presents with   • Flank Pain     right sided for about 2 weeks. Pt has had kidney infections in the past. + Dysuria.   • N/V     Started about 4 days ago when taking care of sick spouse with same sx. N/V and fevers. Afibril here.        HPI  Yane Orellana is a 33 y.o. female who presents with chief complaint of dysuria, urgency and frequency.  Patient with a long-standing history of recurrent urinary tract infections.  Patient reports history of kidney infections in the past.  She is concerned she may have a kidney infection now she is having some mild right flank pain as well.  She denies any associated diarrhea or change in her bowel movements.  She denies any nicolas abdominal pain.  Patient reports 2 episodes of nonbloody nonbilious emesis today.  Patient without any fever or chills.  Patient denies any bleeding outside of her cycle or major change in physiologic vaginal discharge.      REVIEW OF SYSTEMS  Review of Systems   Constitutional: Positive for malaise/fatigue.   Respiratory: Negative for cough and hemoptysis.    Cardiovascular: Negative for chest pain and palpitations.   Gastrointestinal: Positive for nausea and vomiting. Negative for abdominal pain, constipation and diarrhea.   Genitourinary: Positive for dysuria, frequency and urgency.   Musculoskeletal: Negative for myalgias.   Neurological: Negative for dizziness and headaches.       See HPI for further details. All other systems are negative.     PAST MEDICAL HISTORY   has a past medical history of ASTHMA (2006); Chlamydia (5/2010); Fall (06/22/2010); Migraine; Other specified symptom associated with female genital organs; Psychiatric disorder (2008); Psychiatric problem; Recurrent UTI (2010); Urinary tract infection; and Vaginal yeast infections (2010).    SOCIAL HISTORY  Social History     Social History Main Topics   • Smoking status: Former Smoker     Quit date: 9/18/2018   •  Smokeless tobacco: Never Used      Comment: Vape 6 mg    • Alcohol use Yes      Comment: Occasionally   • Drug use: Yes     Types: Marijuana      Comment: medical card    • Sexual activity: Yes       SURGICAL HISTORY   has a past surgical history that includes gyn surgery.    CURRENT MEDICATIONS  Home Medications    **Home medications have not yet been reviewed for this encounter**         ALLERGIES  Allergies   Allergen Reactions   • Geodon [Ziprasidone Hydrochloride] Anaphylaxis       PHYSICAL EXAM  Physical Exam   Constitutional: She is oriented to person, place, and time. She appears well-developed and well-nourished.   HENT:   Head: Normocephalic and atraumatic.   Eyes: Conjunctivae are normal.   Neck: Normal range of motion. Neck supple.   Cardiovascular: Normal rate and regular rhythm.    Pulmonary/Chest: Effort normal and breath sounds normal.   Abdominal: Soft. Bowel sounds are normal. She exhibits no distension. There is no tenderness. There is no rebound.   No CVA tenderness   Neurological: She is alert and oriented to person, place, and time.   Skin: Skin is warm and dry. No rash noted.   Psychiatric: She has a normal mood and affect. Her behavior is normal.         DIAGNOSTIC STUDIES / PROCEDURES      LABS  Results for orders placed or performed during the hospital encounter of 04/18/19   URINALYSIS CULTURE, IF INDICATED   Result Value Ref Range    Color Yellow     Character Cloudy (A)     Specific Gravity 1.023 <1.035    Ph 6.0 5.0 - 8.0    Glucose Negative Negative mg/dL    Ketones Trace (A) Negative mg/dL    Protein Negative Negative mg/dL    Bilirubin Negative Negative    Urobilinogen, Urine 1.0 Negative    Nitrite Negative Negative    Leukocyte Esterase Small (A) Negative    Occult Blood Negative Negative    Micro Urine Req Microscopic    HCG QUALITATIVE UR   Result Value Ref Range    Beta-Hcg Urine Negative Negative   CBC WITH DIFFERENTIAL   Result Value Ref Range    WBC 9.7 4.8 - 10.8 K/uL    RBC  4.41 4.20 - 5.40 M/uL    Hemoglobin 14.0 12.0 - 16.0 g/dL    Hematocrit 42.2 37.0 - 47.0 %    MCV 95.7 81.4 - 97.8 fL    MCH 31.7 27.0 - 33.0 pg    MCHC 33.2 (L) 33.6 - 35.0 g/dL    RDW 44.5 35.9 - 50.0 fL    Platelet Count 243 164 - 446 K/uL    MPV 9.6 9.0 - 12.9 fL    Neutrophils-Polys 59.50 44.00 - 72.00 %    Lymphocytes 34.20 22.00 - 41.00 %    Monocytes 5.40 0.00 - 13.40 %    Eosinophils 0.20 0.00 - 6.90 %    Basophils 0.40 0.00 - 1.80 %    Immature Granulocytes 0.30 0.00 - 0.90 %    Nucleated RBC 0.00 /100 WBC    Neutrophils (Absolute) 5.75 2.00 - 7.15 K/uL    Lymphs (Absolute) 3.30 1.00 - 4.80 K/uL    Monos (Absolute) 0.52 0.00 - 0.85 K/uL    Eos (Absolute) 0.02 0.00 - 0.51 K/uL    Baso (Absolute) 0.04 0.00 - 0.12 K/uL    Immature Granulocytes (abs) 0.03 0.00 - 0.11 K/uL    NRBC (Absolute) 0.00 K/uL   COMP METABOLIC PANEL   Result Value Ref Range    Sodium 136 135 - 145 mmol/L    Potassium 4.3 3.6 - 5.5 mmol/L    Chloride 105 96 - 112 mmol/L    Co2 24 20 - 33 mmol/L    Anion Gap 7.0 0.0 - 11.9    Glucose 84 65 - 99 mg/dL    Bun 8 8 - 22 mg/dL    Creatinine 0.57 0.50 - 1.40 mg/dL    Calcium 9.6 8.5 - 10.5 mg/dL    AST(SGOT) 16 12 - 45 U/L    ALT(SGPT) 11 2 - 50 U/L    Alkaline Phosphatase 66 30 - 99 U/L    Total Bilirubin 0.7 0.1 - 1.5 mg/dL    Albumin 4.1 3.2 - 4.9 g/dL    Total Protein 7.0 6.0 - 8.2 g/dL    Globulin 2.9 1.9 - 3.5 g/dL    A-G Ratio 1.4 g/dL   REFRACTOMETER SG   Result Value Ref Range    Specific Gravity 1.023    URINE MICROSCOPIC (W/UA)   Result Value Ref Range    WBC 5-10 (A) /hpf    RBC 2-5 (A) /hpf    Bacteria Moderate (A) None /hpf    Epithelial Cells Many (A) /hpf    Mucous Threads Many /hpf   Chlamydia/GC PCR Urine Or Swab   Result Value Ref Range    Source Urine    ESTIMATED GFR   Result Value Ref Range    GFR If African American >60 >60 mL/min/1.73 m 2    GFR If Non African American >60 >60 mL/min/1.73 m 2   PERIPHERAL SMEAR REVIEW   Result Value Ref Range    Peripheral Smear Review  see below    PLATELET ESTIMATE   Result Value Ref Range    Plt Estimation Normal    MORPHOLOGY   Result Value Ref Range    RBC Morphology Normal    DIFFERENTIAL COMMENT   Result Value Ref Range    Comments-Diff see below          RADIOLOGY  No orders to display           COURSE & MEDICAL DECISION MAKING  Pertinent Labs & Imaging studies reviewed. (See chart for details)  Patient here with questionable cystitis, she has no CVA tenderness this, though she has a complaint of flank pain no signs of pyelonephritis.  Patient is able to eat in the emergency department.  Her labs are questionable for cystitis.  Given the questionable flank tenderness and her nausea and vomiting I have treated patient with Ceftin ear for possible pyelonephritis.  She is no vomiting emergency department.  I believe she is safe for outpatient management as she is tolerating p.o. at this point.  I discussed return precautions in depth.  Patient's abdominal exams are benign ability to surgical pathology is very unlikely.  Patient without any  complaints otherwise.  Infected stones unlikely in this patient without any colicky pain.  Patient without any chest pain shortness of breath since just cardiopulmonary diagnosis  The patient will not drink alcohol nor drive with prescribed medications. The patient will return for worsening symptoms and is stable at the time of discharge. The patient verbalizes understanding and will comply.    FINAL IMPRESSION  1.   1. Cystitis               Electronically signed by: Nasir Anders, 4/19/2019 12:03 AM

## 2019-04-19 NOTE — ED NOTES
Difficult stick, PIV established via US. Labs collected and sent, as well as urine sample. ERP at bedside.

## 2020-05-24 ENCOUNTER — APPOINTMENT (OUTPATIENT)
Dept: RADIOLOGY | Facility: MEDICAL CENTER | Age: 35
End: 2020-05-24
Attending: EMERGENCY MEDICINE
Payer: COMMERCIAL

## 2020-05-24 ENCOUNTER — HOSPITAL ENCOUNTER (EMERGENCY)
Facility: MEDICAL CENTER | Age: 35
End: 2020-05-24
Attending: EMERGENCY MEDICINE
Payer: COMMERCIAL

## 2020-05-24 VITALS
RESPIRATION RATE: 18 BRPM | HEIGHT: 64 IN | HEART RATE: 92 BPM | BODY MASS INDEX: 34.83 KG/M2 | WEIGHT: 204 LBS | SYSTOLIC BLOOD PRESSURE: 104 MMHG | DIASTOLIC BLOOD PRESSURE: 61 MMHG | OXYGEN SATURATION: 96 % | TEMPERATURE: 98.2 F

## 2020-05-24 DIAGNOSIS — B96.89 BACTERIAL VAGINOSIS: ICD-10-CM

## 2020-05-24 DIAGNOSIS — Z20.2 POSSIBLE EXPOSURE TO STD: ICD-10-CM

## 2020-05-24 DIAGNOSIS — N76.0 BACTERIAL VAGINOSIS: ICD-10-CM

## 2020-05-24 DIAGNOSIS — R10.30 LOWER ABDOMINAL PAIN: ICD-10-CM

## 2020-05-24 LAB
ALBUMIN SERPL BCP-MCNC: 4.3 G/DL (ref 3.2–4.9)
ALBUMIN/GLOB SERPL: 1.3 G/DL
ALP SERPL-CCNC: 88 U/L (ref 30–99)
ALT SERPL-CCNC: 24 U/L (ref 2–50)
ANION GAP SERPL CALC-SCNC: 17 MMOL/L (ref 7–16)
APPEARANCE UR: CLEAR
AST SERPL-CCNC: 23 U/L (ref 12–45)
BACTERIA #/AREA URNS HPF: ABNORMAL /HPF
BASOPHILS # BLD AUTO: 0.6 % (ref 0–1.8)
BASOPHILS # BLD: 0.07 K/UL (ref 0–0.12)
BILIRUB SERPL-MCNC: 1.1 MG/DL (ref 0.1–1.5)
BILIRUB UR QL STRIP.AUTO: NEGATIVE
BUN SERPL-MCNC: 5 MG/DL (ref 8–22)
CALCIUM SERPL-MCNC: 9.8 MG/DL (ref 8.5–10.5)
CANDIDA DNA VAG QL PROBE+SIG AMP: NEGATIVE
CHLORIDE SERPL-SCNC: 99 MMOL/L (ref 96–112)
CO2 SERPL-SCNC: 22 MMOL/L (ref 20–33)
COLOR UR: YELLOW
CREAT SERPL-MCNC: 0.63 MG/DL (ref 0.5–1.4)
EOSINOPHIL # BLD AUTO: 0.12 K/UL (ref 0–0.51)
EOSINOPHIL NFR BLD: 1 % (ref 0–6.9)
EPI CELLS #/AREA URNS HPF: ABNORMAL /HPF
ERYTHROCYTE [DISTWIDTH] IN BLOOD BY AUTOMATED COUNT: 44.5 FL (ref 35.9–50)
G VAGINALIS DNA VAG QL PROBE+SIG AMP: POSITIVE
GLOBULIN SER CALC-MCNC: 3.3 G/DL (ref 1.9–3.5)
GLUCOSE BLD-MCNC: 82 MG/DL (ref 65–99)
GLUCOSE SERPL-MCNC: 97 MG/DL (ref 65–99)
GLUCOSE UR STRIP.AUTO-MCNC: NEGATIVE MG/DL
HCG SERPL QL: NEGATIVE
HCT VFR BLD AUTO: 48.1 % (ref 37–47)
HGB BLD-MCNC: 15.9 G/DL (ref 12–16)
IMM GRANULOCYTES # BLD AUTO: 0.06 K/UL (ref 0–0.11)
IMM GRANULOCYTES NFR BLD AUTO: 0.5 % (ref 0–0.9)
KETONES UR STRIP.AUTO-MCNC: 15 MG/DL
LEUKOCYTE ESTERASE UR QL STRIP.AUTO: ABNORMAL
LIPASE SERPL-CCNC: 33 U/L (ref 11–82)
LYMPHOCYTES # BLD AUTO: 2.03 K/UL (ref 1–4.8)
LYMPHOCYTES NFR BLD: 16.6 % (ref 22–41)
MCH RBC QN AUTO: 31.4 PG (ref 27–33)
MCHC RBC AUTO-ENTMCNC: 33.1 G/DL (ref 33.6–35)
MCV RBC AUTO: 94.9 FL (ref 81.4–97.8)
MICRO URNS: ABNORMAL
MONOCYTES # BLD AUTO: 0.5 K/UL (ref 0–0.85)
MONOCYTES NFR BLD AUTO: 4.1 % (ref 0–13.4)
NEUTROPHILS # BLD AUTO: 9.42 K/UL (ref 2–7.15)
NEUTROPHILS NFR BLD: 77.2 % (ref 44–72)
NITRITE UR QL STRIP.AUTO: NEGATIVE
NRBC # BLD AUTO: 0 K/UL
NRBC BLD-RTO: 0 /100 WBC
PH UR STRIP.AUTO: 5.5 [PH] (ref 5–8)
PLATELET # BLD AUTO: 347 K/UL (ref 164–446)
PMV BLD AUTO: 9.6 FL (ref 9–12.9)
POTASSIUM SERPL-SCNC: 3.5 MMOL/L (ref 3.6–5.5)
PROT SERPL-MCNC: 7.6 G/DL (ref 6–8.2)
PROT UR QL STRIP: NEGATIVE MG/DL
RBC # BLD AUTO: 5.07 M/UL (ref 4.2–5.4)
RBC # URNS HPF: ABNORMAL /HPF
RBC UR QL AUTO: NEGATIVE
SODIUM SERPL-SCNC: 138 MMOL/L (ref 135–145)
SP GR UR STRIP.AUTO: <=1.005
T VAGINALIS DNA VAG QL PROBE+SIG AMP: NEGATIVE
UROBILINOGEN UR STRIP.AUTO-MCNC: 0.2 MG/DL
WBC # BLD AUTO: 12.2 K/UL (ref 4.8–10.8)
WBC #/AREA URNS HPF: ABNORMAL /HPF

## 2020-05-24 PROCEDURE — 36415 COLL VENOUS BLD VENIPUNCTURE: CPT

## 2020-05-24 PROCEDURE — 83690 ASSAY OF LIPASE: CPT

## 2020-05-24 PROCEDURE — 87510 GARDNER VAG DNA DIR PROBE: CPT

## 2020-05-24 PROCEDURE — 700117 HCHG RX CONTRAST REV CODE 255: Performed by: EMERGENCY MEDICINE

## 2020-05-24 PROCEDURE — 80053 COMPREHEN METABOLIC PANEL: CPT

## 2020-05-24 PROCEDURE — 700102 HCHG RX REV CODE 250 W/ 637 OVERRIDE(OP): Performed by: EMERGENCY MEDICINE

## 2020-05-24 PROCEDURE — 87591 N.GONORRHOEAE DNA AMP PROB: CPT

## 2020-05-24 PROCEDURE — 87491 CHLMYD TRACH DNA AMP PROBE: CPT

## 2020-05-24 PROCEDURE — 82962 GLUCOSE BLOOD TEST: CPT

## 2020-05-24 PROCEDURE — 81001 URINALYSIS AUTO W/SCOPE: CPT

## 2020-05-24 PROCEDURE — 96372 THER/PROPH/DIAG INJ SC/IM: CPT

## 2020-05-24 PROCEDURE — 85025 COMPLETE CBC W/AUTO DIFF WBC: CPT

## 2020-05-24 PROCEDURE — 96375 TX/PRO/DX INJ NEW DRUG ADDON: CPT

## 2020-05-24 PROCEDURE — 87480 CANDIDA DNA DIR PROBE: CPT

## 2020-05-24 PROCEDURE — 99285 EMERGENCY DEPT VISIT HI MDM: CPT

## 2020-05-24 PROCEDURE — 96374 THER/PROPH/DIAG INJ IV PUSH: CPT

## 2020-05-24 PROCEDURE — 87660 TRICHOMONAS VAGIN DIR PROBE: CPT

## 2020-05-24 PROCEDURE — 700111 HCHG RX REV CODE 636 W/ 250 OVERRIDE (IP): Performed by: EMERGENCY MEDICINE

## 2020-05-24 PROCEDURE — A9270 NON-COVERED ITEM OR SERVICE: HCPCS | Performed by: EMERGENCY MEDICINE

## 2020-05-24 PROCEDURE — 84703 CHORIONIC GONADOTROPIN ASSAY: CPT

## 2020-05-24 PROCEDURE — 74177 CT ABD & PELVIS W/CONTRAST: CPT

## 2020-05-24 RX ORDER — METRONIDAZOLE 500 MG/1
500 TABLET ORAL ONCE
Status: COMPLETED | OUTPATIENT
Start: 2020-05-24 | End: 2020-05-24

## 2020-05-24 RX ORDER — METRONIDAZOLE 500 MG/1
500 TABLET ORAL 2 TIMES DAILY
Qty: 14 TAB | Refills: 0 | Status: SHIPPED | OUTPATIENT
Start: 2020-05-24 | End: 2020-05-31

## 2020-05-24 RX ORDER — CEFTRIAXONE SODIUM 250 MG/1
250 INJECTION, POWDER, FOR SOLUTION INTRAMUSCULAR; INTRAVENOUS ONCE
Status: COMPLETED | OUTPATIENT
Start: 2020-05-24 | End: 2020-05-24

## 2020-05-24 RX ORDER — ONDANSETRON 2 MG/ML
4 INJECTION INTRAMUSCULAR; INTRAVENOUS ONCE
Status: COMPLETED | OUTPATIENT
Start: 2020-05-24 | End: 2020-05-24

## 2020-05-24 RX ORDER — AZITHROMYCIN 250 MG/1
1000 TABLET, FILM COATED ORAL ONCE
Status: COMPLETED | OUTPATIENT
Start: 2020-05-24 | End: 2020-05-24

## 2020-05-24 RX ADMIN — ONDANSETRON 4 MG: 2 INJECTION INTRAMUSCULAR; INTRAVENOUS at 06:33

## 2020-05-24 RX ADMIN — CEFTRIAXONE SODIUM 250 MG: 250 INJECTION, POWDER, FOR SOLUTION INTRAMUSCULAR; INTRAVENOUS at 11:31

## 2020-05-24 RX ADMIN — IOHEXOL 100 ML: 350 INJECTION, SOLUTION INTRAVENOUS at 10:43

## 2020-05-24 RX ADMIN — AZITHROMYCIN MONOHYDRATE 1000 MG: 250 TABLET ORAL at 11:31

## 2020-05-24 RX ADMIN — METRONIDAZOLE 500 MG: 500 TABLET ORAL at 11:47

## 2020-05-24 RX ADMIN — FENTANYL CITRATE 50 MCG: 50 INJECTION INTRAMUSCULAR; INTRAVENOUS at 06:33

## 2020-05-24 ASSESSMENT — FIBROSIS 4 INDEX: FIB4 SCORE: 0.66

## 2020-05-24 NOTE — ED TRIAGE NOTES
"Chief Complaint   Patient presents with   • Abdominal Pain     RLQ, LLQ       /89   Pulse 86   Temp 36 °C (96.8 °F) (Temporal)   Resp 12   Ht 1.626 m (5' 4\")   Wt 92.5 kg (204 lb)   LMP 03/25/2020 (Within Months)   SpO2 100%   BMI 35.02 kg/m²     Patient BIB EMS for abdominal pain.  Patient stated abdominal pain present RLQ and LLQ, present for about 1 week, patient states it feels \"uncomfortable\" patient ranks pain 7-8/10. Patient confused to date. Patient also does not know when her last period was. States it was \"a couple months ago\". Per EMS patient stated pain feel similar to when she had a UIT. RN asked patient to provide urine sample. Patient refusing at this time. Chart marked for ERP. RN to start PIV and draw labs.   "

## 2020-05-24 NOTE — ED NOTES
Called pt mother for pt and pt mother Juhi coming to pick pt up, pt mother is a hour and a half away, informed will be in the lobby waiting for her.  Discharge instructions given.  All questions answered.  Prescriptions given x1, information given for Care chest and Good RX for pt if insurance unsuccessful.  Pt to follow-up with PCP, return to ER if symptoms worsen as discussed.  Pt verbalized understanding.  All belongings with pt.  Pt ambulated to lobby.

## 2020-05-24 NOTE — ED NOTES
Pt requesting dose of flagyl prior to discharge.  Spoke to ERP Dr Javier and order placed.  Pt medicated per MAR.

## 2020-05-24 NOTE — ED PROVIDER NOTES
"ED Provider Note    CHIEF COMPLAINT  Chief Complaint   Patient presents with   • Abdominal Pain     RLQ, LLQ       HPI  Yane Orellana is a 34 y.o. female who presents to the emergency department through triage for lower abdominal pain.  Patient describes 1 week of progressive lower abdominal pain, points to the suprapubic region.  Pressure, cramping, 7 out of 10.  No flank pain.  Denies dysuria, hematuria or frequency but states \"it feels like the beginning of a UTI.\"  Unknown LMP, unknown pregnancy.  Unknown STD risk \"but I like to get checked for everything,\" and when asked about vaginal discharge \"I do not know I can see down there.\"  Mild nausea without vomiting.  No diarrhea.  Denies constipation but states normal bowel movements for her only twice weekly.    Patient provides little history, very poor historian versus elusive with details, previous pregnancies, previous miscarriage or more likely , although patient cannot elaborate as to when, but states eventually \"years ago.\"    REVIEW OF SYSTEMS  See HPI for further details. All other systems are negative.     PAST MEDICAL HISTORY   has a past medical history of ASTHMA (), Chlamydia (2010), Fall (2010), Migraine, Other specified symptom associated with female genital organs, Psychiatric disorder (), Psychiatric problem, Recurrent UTI (), Urinary tract infection, and Vaginal yeast infections ().    SOCIAL HISTORY  Social History     Tobacco Use   • Smoking status: Former Smoker     Last attempt to quit: 2018     Years since quittin.6   • Smokeless tobacco: Never Used   • Tobacco comment: Vape 6 mg    Substance and Sexual Activity   • Alcohol use: Yes     Comment: Occasionally   • Drug use: Yes     Types: Marijuana     Comment: medical card    • Sexual activity: Yes       SURGICAL HISTORY   has a past surgical history that includes gyn surgery.    CURRENT MEDICATIONS  Home Medications    **Home medications have not yet " "been reviewed for this encounter**         ALLERGIES  Allergies   Allergen Reactions   • Geodon [Ziprasidone Hydrochloride] Anaphylaxis       PHYSICAL EXAM  VITAL SIGNS: /62   Pulse 90   Temp 36 °C (96.8 °F) (Temporal)   Resp 16   Ht 1.626 m (5' 4\")   Wt 92.5 kg (204 lb)   LMP 03/25/2020 (Within Months)   SpO2 96%   BMI 35.02 kg/m²   Pulse ox interpretation: I interpret this pulse ox as normal.  Constitutional: Alert in no apparent distress.  Disheveled.  HENT: Normocephalic, atraumatic. Bilateral external ears normal, Nose normal. Moist mucous membranes.    Eyes: Pupils are equal and reactive, Conjunctiva normal.   Neck: Normal range of motion, Supple   Lymphatic: No lymphadenopathy noted.   Cardiovascular: Regular rate and rhythm, no murmurs. Distal pulses intact..  Thorax & Lungs: Normal breath sounds.  No wheezing/rales/ronchi. No increased work of breathing.   Abdomen: Obese, soft, non-distended.  Tender to palpation across the low abdomen without rebound, guarding or peritonitis.  No McBurney point tenderness.  No CVA tenderness percussion.  : Normal external genitalia.  Nonfriable cervix.  Cannot nonodorous discharge.  Samples taken.  Bimanual exam without cervical motion or adnexal tenderness or palpable mass.  Skin: Warm, Dry, No erythema, No rash.   Musculoskeletal: Good range of motion in all major joints.   Neurologic: Alert.  Ambulates independently.  Psychiatric: Flat, depressed affect.  Withdrawn.  Elusive with details versus very poor historian.      DIAGNOSTIC STUDIES / PROCEDURES    LABS  Results for orders placed or performed during the hospital encounter of 05/24/20   CBC WITH DIFFERENTIAL   Result Value Ref Range    WBC 12.2 (H) 4.8 - 10.8 K/uL    RBC 5.07 4.20 - 5.40 M/uL    Hemoglobin 15.9 12.0 - 16.0 g/dL    Hematocrit 48.1 (H) 37.0 - 47.0 %    MCV 94.9 81.4 - 97.8 fL    MCH 31.4 27.0 - 33.0 pg    MCHC 33.1 (L) 33.6 - 35.0 g/dL    RDW 44.5 35.9 - 50.0 fL    Platelet Count 347 " 164 - 446 K/uL    MPV 9.6 9.0 - 12.9 fL    Neutrophils-Polys 77.20 (H) 44.00 - 72.00 %    Lymphocytes 16.60 (L) 22.00 - 41.00 %    Monocytes 4.10 0.00 - 13.40 %    Eosinophils 1.00 0.00 - 6.90 %    Basophils 0.60 0.00 - 1.80 %    Immature Granulocytes 0.50 0.00 - 0.90 %    Nucleated RBC 0.00 /100 WBC    Neutrophils (Absolute) 9.42 (H) 2.00 - 7.15 K/uL    Lymphs (Absolute) 2.03 1.00 - 4.80 K/uL    Monos (Absolute) 0.50 0.00 - 0.85 K/uL    Eos (Absolute) 0.12 0.00 - 0.51 K/uL    Baso (Absolute) 0.07 0.00 - 0.12 K/uL    Immature Granulocytes (abs) 0.06 0.00 - 0.11 K/uL    NRBC (Absolute) 0.00 K/uL   COMP METABOLIC PANEL   Result Value Ref Range    Sodium 138 135 - 145 mmol/L    Potassium 3.5 (L) 3.6 - 5.5 mmol/L    Chloride 99 96 - 112 mmol/L    Co2 22 20 - 33 mmol/L    Anion Gap 17.0 (H) 7.0 - 16.0    Glucose 97 65 - 99 mg/dL    Bun 5 (L) 8 - 22 mg/dL    Creatinine 0.63 0.50 - 1.40 mg/dL    Calcium 9.8 8.5 - 10.5 mg/dL    AST(SGOT) 23 12 - 45 U/L    ALT(SGPT) 24 2 - 50 U/L    Alkaline Phosphatase 88 30 - 99 U/L    Total Bilirubin 1.1 0.1 - 1.5 mg/dL    Albumin 4.3 3.2 - 4.9 g/dL    Total Protein 7.6 6.0 - 8.2 g/dL    Globulin 3.3 1.9 - 3.5 g/dL    A-G Ratio 1.3 g/dL   LIPASE   Result Value Ref Range    Lipase 33 11 - 82 U/L   URINALYSIS CULTURE, IF INDICATED    Specimen: Urine   Result Value Ref Range    Color Yellow     Character Clear     Specific Gravity <=1.005 <1.035    Ph 5.5 5.0 - 8.0    Glucose Negative Negative mg/dL    Ketones 15 (A) Negative mg/dL    Protein Negative Negative mg/dL    Bilirubin Negative Negative    Urobilinogen, Urine 0.2 Negative    Nitrite Negative Negative    Leukocyte Esterase Trace (A) Negative    Occult Blood Negative Negative    Micro Urine Req Microscopic    HCG QUAL SERUM   Result Value Ref Range    Beta-Hcg Qualitative Serum Negative Negative   CHLAMYDIA & GC BY PCR    Specimen: Genital   Result Value Ref Range    Source Urine    ESTIMATED GFR   Result Value Ref Range    GFR If  African American >60 >60 mL/min/1.73 m 2    GFR If Non African American >60 >60 mL/min/1.73 m 2   URINE MICROSCOPIC (W/UA)   Result Value Ref Range    WBC 2-5 /hpf    RBC 0-2 /hpf    Bacteria Rare (A) None /hpf    Epithelial Cells Few /hpf   VAGINAL PATHOGENS DNA PANEL   Result Value Ref Range    Candida species DNA Probe Negative Negative    Trichamonas vaginalis DNA Probe Negative Negative    Gardnerella vaginalis DNA Probe POSITIVE (A) Negative   ACCU-CHEK GLUCOSE   Result Value Ref Range    Glucose - Accu-Ck 82 65 - 99 mg/dL     RADIOLOGY  CT-ABDOMEN-PELVIS WITH   Final Result      No acute intra-abdominal abnormality is seen.            COURSE & MEDICAL DECISION MAKING  Nursing notes and vital signs were reviewed. (See chart for details)  The patients records were reviewed, history was obtained from the patient;     Initial evaluation for low abdominal pain is quite unrevealing.  Labs within normal limits, as is the urinalysis.  Pregnancy negative.  However patient, with odd affect, elusive to answers continues to complain of discomfort.  Pelvic exam as above, was unremarkable although samples were taken due to her concern for STD.  She wishes to be treated empirically for gonorrhea and chlamydia.  Will follow wet prep as well.  Add CT for evaluation nonspecific pain.  Otherwise hemodynamically stable.    Wet prep consistent with Gardnerella.  Will add Flagyl to discharge.    CT without acute intra-abdominal pathology.  Patient seems to be resting comfortably.  Low abdominal discomfort may indeed be secondary to the bacterial vaginosis.  She is been treated empirically for gonorrhea and chlamydia, results still pending.  There is no clinical evidence for PID.  Hemodynamically stable without fever, tachycardia or hypotension.  Pain controlled without medication in the emergency department.    Patient is stable for discharge at this time, anticipatory guidance provided,Flagyl for 7 days, close follow-up is  encouraged, and strict ED return instructions have been detailed. Patient is agreeable to the disposition and plan.    Patient's blood pressure was elevated in the emergency department, and has been referred to primary care for close monitoring.    FINAL IMPRESSION  (R10.30) Lower abdominal pain  (N76.0,  B96.89) Bacterial vaginosis  (Z20.2) Possible exposure to STD      Electronically signed by: Shana Marion D.O., 5/24/2020 6:03 AM      This dictation was created using voice recognition software. The accuracy of the dictation is limited to the abilities of the software. I expect there may be some errors of grammar and possibly content. The nursing notes were reviewed and certain aspects of this information were incorporated into this note.

## 2020-05-24 NOTE — DISCHARGE INSTRUCTIONS
Follow-up with primary care this week for reevaluation, medication management close blood pressure monitoring.    You are treated empirically for possible gonorrhea and chlamydia, although study are not back yet.  Take Flagyl twice daily for 7 days for bacterial vaginosis.    Tylenol or ibuprofen for abdominal pain or cramping.    Return to the emergency department for persistent worsening pain, fever, vomiting or other new concerns.

## 2020-05-24 NOTE — ED NOTES
RN attempted PIV x2, able to draw labs from first attempt.   Assist RN in room now to attempt ultrasound.

## 2023-02-09 ENCOUNTER — HOSPITAL ENCOUNTER (EMERGENCY)
Facility: MEDICAL CENTER | Age: 38
End: 2023-02-09
Attending: EMERGENCY MEDICINE
Payer: COMMERCIAL

## 2023-02-09 VITALS
WEIGHT: 235.67 LBS | RESPIRATION RATE: 17 BRPM | TEMPERATURE: 98.2 F | OXYGEN SATURATION: 99 % | BODY MASS INDEX: 40.23 KG/M2 | HEART RATE: 93 BPM | SYSTOLIC BLOOD PRESSURE: 148 MMHG | HEIGHT: 64 IN | DIASTOLIC BLOOD PRESSURE: 90 MMHG

## 2023-02-09 DIAGNOSIS — J30.1 HAY FEVER: ICD-10-CM

## 2023-02-09 DIAGNOSIS — H10.029 OTHER MUCOPURULENT CONJUNCTIVITIS, UNSPECIFIED LATERALITY: ICD-10-CM

## 2023-02-09 PROCEDURE — 99282 EMERGENCY DEPT VISIT SF MDM: CPT

## 2023-02-09 RX ORDER — ERYTHROMYCIN 5 MG/G
1 OINTMENT OPHTHALMIC
Qty: 3.5 G | Refills: 0 | Status: ACTIVE | OUTPATIENT
Start: 2023-02-09 | End: 2023-07-07

## 2023-02-10 ENCOUNTER — HOSPITAL ENCOUNTER (EMERGENCY)
Facility: MEDICAL CENTER | Age: 38
End: 2023-02-10
Attending: EMERGENCY MEDICINE
Payer: COMMERCIAL

## 2023-02-10 VITALS
HEIGHT: 61 IN | HEART RATE: 92 BPM | BODY MASS INDEX: 43.91 KG/M2 | RESPIRATION RATE: 18 BRPM | SYSTOLIC BLOOD PRESSURE: 142 MMHG | TEMPERATURE: 97 F | WEIGHT: 232.59 LBS | DIASTOLIC BLOOD PRESSURE: 70 MMHG | OXYGEN SATURATION: 96 %

## 2023-02-10 DIAGNOSIS — J02.9 PHARYNGITIS, UNSPECIFIED ETIOLOGY: ICD-10-CM

## 2023-02-10 DIAGNOSIS — J02.9 SORE THROAT: ICD-10-CM

## 2023-02-10 LAB — S PYO DNA SPEC NAA+PROBE: NOT DETECTED

## 2023-02-10 PROCEDURE — 700111 HCHG RX REV CODE 636 W/ 250 OVERRIDE (IP): Performed by: EMERGENCY MEDICINE

## 2023-02-10 PROCEDURE — 99283 EMERGENCY DEPT VISIT LOW MDM: CPT

## 2023-02-10 PROCEDURE — 87651 STREP A DNA AMP PROBE: CPT

## 2023-02-10 PROCEDURE — 700102 HCHG RX REV CODE 250 W/ 637 OVERRIDE(OP): Performed by: EMERGENCY MEDICINE

## 2023-02-10 PROCEDURE — A9270 NON-COVERED ITEM OR SERVICE: HCPCS | Performed by: EMERGENCY MEDICINE

## 2023-02-10 RX ORDER — KETOROLAC TROMETHAMINE 30 MG/ML
15 INJECTION, SOLUTION INTRAMUSCULAR; INTRAVENOUS ONCE
Status: DISCONTINUED | OUTPATIENT
Start: 2023-02-10 | End: 2023-02-10 | Stop reason: HOSPADM

## 2023-02-10 RX ORDER — DEXAMETHASONE SODIUM PHOSPHATE 4 MG/ML
10 INJECTION, SOLUTION INTRA-ARTICULAR; INTRALESIONAL; INTRAMUSCULAR; INTRAVENOUS; SOFT TISSUE ONCE
Status: COMPLETED | OUTPATIENT
Start: 2023-02-10 | End: 2023-02-10

## 2023-02-10 RX ADMIN — DEXAMETHASONE SODIUM PHOSPHATE 10 MG: 4 INJECTION, SOLUTION INTRA-ARTICULAR; INTRALESIONAL; INTRAMUSCULAR; INTRAVENOUS; SOFT TISSUE at 03:00

## 2023-02-10 RX ADMIN — LIDOCAINE HYDROCHLORIDE 30 ML: 20 SOLUTION OROPHARYNGEAL at 01:38

## 2023-02-10 NOTE — ED TRIAGE NOTES
"Chief Complaint   Patient presents with    Sore Throat     X2 weeks, pt having trouble swallowing.     Eye Pain     Pt states \"my tear ducts are a little swollen\"     Pt ambulatory to triage for above complaint.      Pt is alert/oriented and follows commands. Pt speaking in full sentences and responds appropriately to questions. No acute distress noted in triage and respirations are even and unlabored.     Pt placed in lobby and educated on triage process. Pt encouraged to alert staff for any changes in condition.    "

## 2023-02-10 NOTE — ED NOTES
PT ambulatory with steady gait from WR to RM blue 20. PT changed into gown, attached to monitor, provided warm blanket and call bell.

## 2023-02-10 NOTE — ED NOTES
Pt provided d/c teaching. Vitals assessed and WNL. No IV to d/c. Pt ambulates to exit with steady gait and all belongings.

## 2023-02-10 NOTE — ED NOTES
PT states she is unable to make urine at this time, PT provided cup of water and requested to call in RN when ready to attempt to provide urine sample.

## 2023-02-10 NOTE — ED PROVIDER NOTES
"ED Provider Note    CHIEF COMPLAINT  Chief Complaint   Patient presents with    Sore Throat     X2 weeks, pt having trouble swallowing.     Eye Pain     Pt states \"my tear ducts are a little swollen\"     EXTERNAL RECORDS REVIEWED  Outpatient Notes outpatient encounter for primary care follow-up    HPI/ROS  LIMITATION TO HISTORY   Select: : None  OUTSIDE HISTORIAN(S):  none    Yane Orellana is a 37 y.o. female with a past medical history of asthma, schizoaffective disorder who presents today for concerns regarding sore throat for 2 weeks and eye discomfort.  Patient had been seen for eye discomfort and discharge had been started on erythromycin yesterday.  She says in the interval she has been having worsening sore throat saying that is very painful when she swallows.  She has been able to tolerate p.o. intake though has more pain and discomfort with this.  She is denying any recent fevers, no phonation changes, no other acute head or neck pain has not had any surgical procedure or prior history of immunosuppression.    Was seen on 2/9 by my colleague for cough, congestion to have allergic versus early bacterial conjunctivitis.  She has stable vital signs at that time, she had no increased work of breathing and no signs of bacterial infection on the head neck exam.  She was prescribed erythromycin ointment for crusting and hyperemia    PAST MEDICAL HISTORY   has a past medical history of ASTHMA (2006), Chlamydia (5/2010), Fall (06/22/2010), Migraine, Other specified symptom associated with female genital organs, Psychiatric disorder (2008), Psychiatric problem, Recurrent UTI (2010), Urinary tract infection, and Vaginal yeast infections (2010).    SURGICAL HISTORY   has a past surgical history that includes gyn surgery.    FAMILY HISTORY  Family History   Problem Relation Age of Onset    Diabetes Mother     Psychiatric Illness Mother     Diabetes Maternal Grandmother     Diabetes Maternal Grandfather        SOCIAL " "HISTORY  Social History     Tobacco Use    Smoking status: Former     Types: Cigarettes     Quit date: 2018     Years since quittin.4    Smokeless tobacco: Never    Tobacco comments:     Vape 6 mg    Substance and Sexual Activity    Alcohol use: Yes     Comment: Occasionally    Drug use: Yes     Types: Marijuana     Comment: medical card     Sexual activity: Yes       CURRENT MEDICATIONS  Home Medications       Reviewed by Ayush Sewell R.N. (Registered Nurse) on 02/10/23 at 0042  Med List Status: <None>     Medication Last Dose Status   albuterol (VENTOLIN HFA) 108 (90 Base) MCG/ACT Aero Soln inhalation aerosol  Active   ALPRAZolam (XANAX) 0.5 MG Tab  Active   amphetamine-dextroamphetamine (ADDERALL) 10 MG Tab  Active   buPROPion (WELLBUTRIN XL) 150 MG XL tablet  Active   cefdinir (OMNICEF) 300 MG Cap  Active   erythromycin 5 MG/GM Ointment  Active   gabapentin (NEURONTIN) 300 MG Cap  Active   naproxen (NAPROSYN) 500 MG Tab  Active   NON SPECIFIED  Active   NON SPECIFIED  Active   ondansetron (ZOFRAN ODT) 4 MG TABLET DISPERSIBLE  Active   phenazopyridine (PYRIDIUM) 200 MG Tab  Active   propranolol LA (INDERAL LA) 60 MG CAPSULE SR 24 HR  Active                    ALLERGIES  Allergies   Allergen Reactions    Geodon [Ziprasidone Hydrochloride] Anaphylaxis       PHYSICAL EXAM  VITAL SIGNS: BP (!) 142/70   Pulse 92   Temp 36.1 °C (97 °F)   Resp 18   Ht 1.549 m (5' 1\")   Wt 105 kg (232 lb 9.4 oz)   LMP 2022 (Within Weeks)   SpO2 96%   BMI 43.95 kg/m²    Genl: F sitting in gurney uncomfortably, speaking slow and clear, appears in very mild distress   Head: NC/AT   ENT: Mucous membranes moist, posterior pharynx thematous, bilateral exudates, no asymmetry, no fullness in the tonsillar pillars, sublingual tissues are soft, no abnormalities of the buccal mucosa.  Uvula midline, nares patent bilaterally.  Bilateral tympanic membranes and external ear structures are normal  Eyes: Scant bilateral " injected sclera, small amount of discharge noted bilateral medially on the upper and lower margins.  Pupils equal round reactive to light  Neck: Supple, FROM, + bilateral anterior LAD appreciated   Pulmonary: Lungs are clear to auscultation bilaterally  CV:  RRR, no murmur appreciated, pulses 2+ in both upper and lower extremities,  Abdomen: soft, NT/ND; no rebound/guarding  Musculoskeletal: Pain free ROM of the neck. Moving upper and lower extremities and spontaneous in coordinated fashion  Neuro: A&Ox4 (person, place, time, situation), speech fluent, gait steady, no focal deficits appreciated    DIAGNOSTIC STUDIES / PROCEDURES    LABS  Labs Reviewed   GROUP A STREP BY PCR     COURSE & MEDICAL DECISION MAKING    ED Observation Status? No; Patient does not meet criteria for ED Observation.     INITIAL ASSESSMENT, COURSE AND PLAN  Care Narrative: Patient presents emergency room for symptoms as described above.  The patient is alert, oriented, has had recent viral-like symptomology and now presents with worsening sore throat.  She is afebrile, there is no tachycardia, the posterior oropharynx appears universally inflamed with no signs of asymmetry in likelihood of underlying PTA/RPA is low likelihood.  She has intact range of motion, she has no anterior throat nodularity or prominence and has very minimal voice changes with pain primarily with attempted oral intake.  Overall she appears nontoxic and strep test is obtained, she is given anti-inflammatories and GI cocktail to assist with topical pain.    Strep test came back negative, at this time she can be treated with oral anti-inflammatories and steroids to help reduce posterior oropharynx swelling and irritation.  She likely has viral etiology and has no other evolving changes or signs of toxicity.  If she does not have overall improvement in the next 3 to 4 days or if she starts developing fevers or escalating voice changes I would recommend coming back to the  emergency department for subsequent reevaluation.  Questions are addressed and she is discharged home in stable condition.    DISPOSITION AND DISCUSSIONS  Escalation of care considered, and ultimately not performed:IV fluids and diagnostic imaging    Decision tools and prescription drugs considered including, but not limited to: Antibiotics likely viral etiology for pharyngitis .    FINAL DIAGNOSIS  1. Pharyngitis, unspecified etiology    2. Sore throat      Electronically signed by: Jim Garcia M.D., 2/10/2023 1:14 AM

## 2023-02-10 NOTE — ED PROVIDER NOTES
"ED Provider Note    CHIEF COMPLAINT  Chief Complaint   Patient presents with    Cough     Associated with congestion. Pt states, \"I'm having a hay fever flare up\". Pt states symptoms have been going on for 3 months.        EXTERNAL RECORDS REVIEWED  Reviewed outpatient clinic visits    HPI/ROS  LIMITATION TO HISTORY   None  OUTSIDE HISTORIAN(S):  None    Yane Orellana is a 37 y.o. female who presents who presents with multiple symptoms including runny nose scratchiness in the back of throat, self-reported hayfever as well as some redness in the eyes and some crusting in the eyes.  Patient is an otherwise healthy 37-year-old.  She does have a history of asthma but has not been wheezing.  No report of any high fevers chest pain or shortness of breath.    PAST MEDICAL HISTORY   has a past medical history of ASTHMA (), Chlamydia (2010), Fall (2010), Migraine, Other specified symptom associated with female genital organs, Psychiatric disorder (), Psychiatric problem, Recurrent UTI (), Urinary tract infection, and Vaginal yeast infections ().    SURGICAL HISTORY   has a past surgical history that includes gyn surgery.    FAMILY HISTORY  Family History   Problem Relation Age of Onset    Diabetes Mother     Psychiatric Illness Mother     Diabetes Maternal Grandmother     Diabetes Maternal Grandfather        SOCIAL HISTORY  Social History     Tobacco Use    Smoking status: Former     Types: Cigarettes     Quit date: 2018     Years since quittin.3    Smokeless tobacco: Never    Tobacco comments:     Vape 6 mg    Substance and Sexual Activity    Alcohol use: Yes     Comment: Occasionally    Drug use: Yes     Types: Marijuana     Comment: medical card     Sexual activity: Yes       CURRENT MEDICATIONS  Home Medications       Reviewed by Ritu Pagan R.N. (Registered Nurse) on 23 at 1845  Med List Status: Not Addressed     Medication Last Dose Status   albuterol (VENTOLIN HFA) 108 (90 " "Base) MCG/ACT Aero Soln inhalation aerosol  Active   ALPRAZolam (XANAX) 0.5 MG Tab  Active   amphetamine-dextroamphetamine (ADDERALL) 10 MG Tab  Active   buPROPion (WELLBUTRIN XL) 150 MG XL tablet  Active   cefdinir (OMNICEF) 300 MG Cap  Active   gabapentin (NEURONTIN) 300 MG Cap  Active   naproxen (NAPROSYN) 500 MG Tab  Active   NON SPECIFIED  Active   NON SPECIFIED  Active   ondansetron (ZOFRAN ODT) 4 MG TABLET DISPERSIBLE  Active   phenazopyridine (PYRIDIUM) 200 MG Tab  Active   propranolol LA (INDERAL LA) 60 MG CAPSULE SR 24 HR  Active                    ALLERGIES  Allergies   Allergen Reactions    Geodon [Ziprasidone Hydrochloride] Anaphylaxis       PHYSICAL EXAM  VITAL SIGNS: BP (!) 148/90   Pulse 93   Temp 36.7 °C (98 °F) (Temporal)   Resp 18   Ht 1.626 m (5' 4\")   Wt 107 kg (235 lb 10.8 oz)   SpO2 99%   BMI 40.45 kg/m²    Pulse ox interpretation: I interpret this pulse ox as normal.  Constitutional: Alert and oriented x 3, no acute distress  HEENT: Atraumatic normocephalic, pupils are equal round reactive to light extraocular movements are intact.  There is some mild scleral irritation and some crusting at the corners of the eyes concerning for mild conjunctivitis the nares is clear, external ears are normal, mouth shows moist mucous membranes normal dentition for age  Neck: Supple, no JVD no tracheal deviation  Cardiovascular: Regular rate and rhythm no murmur rub or gallop 2+ pulses peripherally x4  Thorax & Lungs: No respiratory distress, no wheezes rales or rhonchi, No chest tenderness.   GI: Soft nontender nondistended positive bowel sounds, no peritoneal signs  Skin: Warm dry no acute rash or lesion  Musculoskeletal: Moving all extremities with full range and 5 of 5 strength no acute  deformity  Neurologic: Cranial nerves III through XII are grossly intact no sensory deficit no cerebellar dysfunction   Psychiatric: Pleasant        COURSE & MEDICAL DECISION MAKING        INITIAL ASSESSMENT, " COURSE AND PLAN  Care Narrative: This is a very pleasant 37-year-old female who presents here with signs and symptoms consistent with likely allergies and may be the development of some allergic and/or early bacterial conjunctivitis.  Her vital signs are reassuring and normal.  Specifically she does not have any high fever or increased work of breathing hypoxia.  Blood pressure is high normal but she has no report of any chest pain leg swelling strokelike symptoms or shortness of breath.  I do not feel that extensive testing is indicated.  We will recommend she start taking over-the-counter nasal steroid such as Flonase.  I will prescribe erythromycin ophthalmic ointment as she does have some conjunctival hyperemia and some crusting exudate concerning for early conjunctivitis      ADDITIONAL PROBLEM LIST    DISPOSITION AND DISCUSSIONS      Escalation of care considered, and ultimately not performed:blood analysis and diagnostic imaging    Barriers to care at this time, including but not limited to: Patient does not have established PCP.     Decision tools and prescription drugs considered including, but not limited to:   Patient will be prescribed ophthalmic antibiotics  FINAL DIAGNOSIS  Allergic rhinitis  Conjunctivitis       Electronically signed by: Peter Snow M.D., 2/9/2023 7:39 PM

## 2023-02-10 NOTE — ED TRIAGE NOTES
"Chief Complaint   Patient presents with    Cough     Associated with congestion. Pt states, \"I'm having a hay fever flare up\". Pt states symptoms have been going on for 3 months.        /78   Pulse 90   Temp 36.7 °C (98 °F) (Temporal)   Resp 18   Ht 1.626 m (5' 4\")   Wt 107 kg (235 lb 10.8 oz)   SpO2 97%   BMI 40.45 kg/m²     "

## 2023-02-18 ENCOUNTER — PHARMACY VISIT (OUTPATIENT)
Dept: PHARMACY | Facility: MEDICAL CENTER | Age: 38
End: 2023-02-18
Payer: COMMERCIAL

## 2023-02-18 ENCOUNTER — APPOINTMENT (OUTPATIENT)
Dept: RADIOLOGY | Facility: MEDICAL CENTER | Age: 38
End: 2023-02-18
Attending: EMERGENCY MEDICINE
Payer: COMMERCIAL

## 2023-02-18 ENCOUNTER — HOSPITAL ENCOUNTER (EMERGENCY)
Facility: MEDICAL CENTER | Age: 38
End: 2023-02-18
Attending: EMERGENCY MEDICINE
Payer: COMMERCIAL

## 2023-02-18 VITALS
SYSTOLIC BLOOD PRESSURE: 118 MMHG | RESPIRATION RATE: 16 BRPM | TEMPERATURE: 98.8 F | WEIGHT: 230.6 LBS | HEIGHT: 64 IN | BODY MASS INDEX: 39.37 KG/M2 | OXYGEN SATURATION: 100 % | DIASTOLIC BLOOD PRESSURE: 71 MMHG | HEART RATE: 76 BPM

## 2023-02-18 DIAGNOSIS — R06.2 WHEEZING: ICD-10-CM

## 2023-02-18 DIAGNOSIS — R05.1 ACUTE COUGH: ICD-10-CM

## 2023-02-18 PROCEDURE — A9270 NON-COVERED ITEM OR SERVICE: HCPCS | Performed by: EMERGENCY MEDICINE

## 2023-02-18 PROCEDURE — 71045 X-RAY EXAM CHEST 1 VIEW: CPT

## 2023-02-18 PROCEDURE — 99284 EMERGENCY DEPT VISIT MOD MDM: CPT

## 2023-02-18 PROCEDURE — 700111 HCHG RX REV CODE 636 W/ 250 OVERRIDE (IP): Performed by: EMERGENCY MEDICINE

## 2023-02-18 PROCEDURE — 700102 HCHG RX REV CODE 250 W/ 637 OVERRIDE(OP): Performed by: EMERGENCY MEDICINE

## 2023-02-18 PROCEDURE — RXMED WILLOW AMBULATORY MEDICATION CHARGE: Performed by: EMERGENCY MEDICINE

## 2023-02-18 RX ORDER — PREDNISONE 20 MG/1
40 TABLET ORAL ONCE
Status: COMPLETED | OUTPATIENT
Start: 2023-02-18 | End: 2023-02-18

## 2023-02-18 RX ORDER — PREDNISONE 20 MG/1
40 TABLET ORAL DAILY
Qty: 10 TABLET | Refills: 0 | Status: SHIPPED | OUTPATIENT
Start: 2023-02-18 | End: 2023-02-23

## 2023-02-18 RX ORDER — ALBUTEROL SULFATE 90 UG/1
2 AEROSOL, METERED RESPIRATORY (INHALATION) EVERY 6 HOURS PRN
Qty: 8.5 G | Refills: 0 | Status: SHIPPED | OUTPATIENT
Start: 2023-02-18 | End: 2023-07-07

## 2023-02-18 RX ORDER — ALBUTEROL SULFATE 90 UG/1
2 AEROSOL, METERED RESPIRATORY (INHALATION) ONCE
Status: COMPLETED | OUTPATIENT
Start: 2023-02-18 | End: 2023-02-18

## 2023-02-18 RX ADMIN — ALBUTEROL SULFATE 2 PUFF: 90 AEROSOL, METERED RESPIRATORY (INHALATION) at 11:02

## 2023-02-18 RX ADMIN — PREDNISONE 40 MG: 20 TABLET ORAL at 11:02

## 2023-02-18 RX ADMIN — Medication 1 LOZENGE: at 11:33

## 2023-02-18 NOTE — ED TRIAGE NOTES
.  Chief Complaint   Patient presents with    Cough    Congestion     Reports chronic Bronchitis.      Ambulated to triage. Pt reports visually impaired is unable to get to pharmacy for medications. Pharmacy changed to [facility].

## 2023-02-18 NOTE — DISCHARGE PLANNING
Explained to patient needs to switch from Medical to Medicaid.  Has an excuse for everything.  Can't go to shelter, can't call mom or go to police.  Explained we will not be purchasing her meds in the future and she needs to deal with her insurance.  Prednisone and inhaler provided for patient with bus ticket and shoes.

## 2023-02-18 NOTE — ED NOTES
PT to [facility] 23. Ambulates with a steady gait. PT states she has had a cough with congestion for 25 years. Lungs clear to auscultation bilaterally.

## 2023-02-25 ENCOUNTER — APPOINTMENT (OUTPATIENT)
Dept: RADIOLOGY | Facility: MEDICAL CENTER | Age: 38
End: 2023-02-25
Attending: EMERGENCY MEDICINE
Payer: COMMERCIAL

## 2023-02-25 ENCOUNTER — HOSPITAL ENCOUNTER (EMERGENCY)
Facility: MEDICAL CENTER | Age: 38
End: 2023-02-25
Attending: EMERGENCY MEDICINE
Payer: COMMERCIAL

## 2023-02-25 VITALS
TEMPERATURE: 97.6 F | HEIGHT: 64 IN | HEART RATE: 95 BPM | RESPIRATION RATE: 16 BRPM | WEIGHT: 231.48 LBS | SYSTOLIC BLOOD PRESSURE: 106 MMHG | BODY MASS INDEX: 39.52 KG/M2 | OXYGEN SATURATION: 90 % | DIASTOLIC BLOOD PRESSURE: 54 MMHG

## 2023-02-25 DIAGNOSIS — J40 BRONCHITIS: ICD-10-CM

## 2023-02-25 DIAGNOSIS — T07.XXXA MULTIPLE CONTUSIONS: ICD-10-CM

## 2023-02-25 DIAGNOSIS — J06.9 UPPER RESPIRATORY TRACT INFECTION, UNSPECIFIED TYPE: ICD-10-CM

## 2023-02-25 LAB — S PYO DNA SPEC NAA+PROBE: NOT DETECTED

## 2023-02-25 PROCEDURE — A9270 NON-COVERED ITEM OR SERVICE: HCPCS | Performed by: EMERGENCY MEDICINE

## 2023-02-25 PROCEDURE — 87651 STREP A DNA AMP PROBE: CPT

## 2023-02-25 PROCEDURE — 99284 EMERGENCY DEPT VISIT MOD MDM: CPT

## 2023-02-25 PROCEDURE — 700102 HCHG RX REV CODE 250 W/ 637 OVERRIDE(OP): Performed by: EMERGENCY MEDICINE

## 2023-02-25 PROCEDURE — 73610 X-RAY EXAM OF ANKLE: CPT | Mod: RT

## 2023-02-25 PROCEDURE — 71045 X-RAY EXAM CHEST 1 VIEW: CPT

## 2023-02-25 RX ORDER — IBUPROFEN 600 MG/1
600 TABLET ORAL ONCE
Status: COMPLETED | OUTPATIENT
Start: 2023-02-25 | End: 2023-02-25

## 2023-02-25 RX ORDER — DEXAMETHASONE 4 MG/1
12 TABLET ORAL ONCE
Status: COMPLETED | OUTPATIENT
Start: 2023-02-25 | End: 2023-02-25

## 2023-02-25 RX ADMIN — IBUPROFEN 600 MG: 600 TABLET, FILM COATED ORAL at 04:35

## 2023-02-25 RX ADMIN — DEXAMETHASONE 12 MG: 4 TABLET ORAL at 04:35

## 2023-02-25 NOTE — DISCHARGE INSTRUCTIONS
Follow-up with primary care Monday or Tuesday for reevaluation.    Tylenol or  ibuprofen as needed for discomfort.  Over-the-counter medications as needed for relief of cold and flu symptoms.    Rest, ice, elevation as needed for leg swelling or discomfort.  Activity as tolerated.    Return the emergency department for difficulty breathing/wheezing/retractions, fever, altered mental status, persistent or worsening leg pain/swelling/discoloration/paresthesias or other new concerns.

## 2023-02-25 NOTE — ED NOTES
This RN woke pt to get dressed to discharge. Pt states she does not have underwear. This RN got patient underwear and pads. Pt ambulatory to restroom with steady gait. Waiting on pt to finish changing to complete discharge process.
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
## 2023-02-25 NOTE — ED NOTES
Pt given coffee, sandwich, and bus pass. Pt ambulatory from ED with steady gait. Discharge instructions reviewed. No questions at this time.
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
## 2023-02-25 NOTE — ED NOTES
Pt ambulated to [facility] from lobby with steady gait.  On monitor, call light in reach. Chart up for ERP.

## 2023-03-05 ENCOUNTER — APPOINTMENT (OUTPATIENT)
Dept: RADIOLOGY | Facility: MEDICAL CENTER | Age: 38
End: 2023-03-05
Attending: EMERGENCY MEDICINE
Payer: MEDICAID

## 2023-03-05 ENCOUNTER — PHARMACY VISIT (OUTPATIENT)
Dept: PHARMACY | Facility: MEDICAL CENTER | Age: 38
End: 2023-03-05
Payer: COMMERCIAL

## 2023-03-05 ENCOUNTER — HOSPITAL ENCOUNTER (EMERGENCY)
Facility: MEDICAL CENTER | Age: 38
End: 2023-03-05
Attending: EMERGENCY MEDICINE
Payer: MEDICAID

## 2023-03-05 VITALS
SYSTOLIC BLOOD PRESSURE: 118 MMHG | RESPIRATION RATE: 16 BRPM | BODY MASS INDEX: 40.12 KG/M2 | OXYGEN SATURATION: 94 % | HEIGHT: 64 IN | WEIGHT: 235 LBS | TEMPERATURE: 98.6 F | DIASTOLIC BLOOD PRESSURE: 71 MMHG | HEART RATE: 100 BPM

## 2023-03-05 VITALS
RESPIRATION RATE: 20 BRPM | OXYGEN SATURATION: 94 % | DIASTOLIC BLOOD PRESSURE: 81 MMHG | WEIGHT: 235 LBS | BODY MASS INDEX: 40.12 KG/M2 | TEMPERATURE: 99 F | HEART RATE: 100 BPM | HEIGHT: 64 IN | SYSTOLIC BLOOD PRESSURE: 127 MMHG

## 2023-03-05 DIAGNOSIS — L03.031 CELLULITIS OF TOE OF RIGHT FOOT: ICD-10-CM

## 2023-03-05 DIAGNOSIS — R05.2 SUBACUTE COUGH: ICD-10-CM

## 2023-03-05 DIAGNOSIS — M79.671 FOOT PAIN, RIGHT: ICD-10-CM

## 2023-03-05 PROCEDURE — A9270 NON-COVERED ITEM OR SERVICE: HCPCS | Performed by: EMERGENCY MEDICINE

## 2023-03-05 PROCEDURE — 94640 AIRWAY INHALATION TREATMENT: CPT

## 2023-03-05 PROCEDURE — 99284 EMERGENCY DEPT VISIT MOD MDM: CPT

## 2023-03-05 PROCEDURE — 73630 X-RAY EXAM OF FOOT: CPT | Mod: RT

## 2023-03-05 PROCEDURE — 99283 EMERGENCY DEPT VISIT LOW MDM: CPT

## 2023-03-05 PROCEDURE — 71045 X-RAY EXAM CHEST 1 VIEW: CPT

## 2023-03-05 PROCEDURE — RXMED WILLOW AMBULATORY MEDICATION CHARGE: Performed by: EMERGENCY MEDICINE

## 2023-03-05 PROCEDURE — 700102 HCHG RX REV CODE 250 W/ 637 OVERRIDE(OP): Performed by: EMERGENCY MEDICINE

## 2023-03-05 PROCEDURE — 700101 HCHG RX REV CODE 250: Performed by: EMERGENCY MEDICINE

## 2023-03-05 RX ORDER — CEPHALEXIN 500 MG/1
500 CAPSULE ORAL 4 TIMES DAILY
Qty: 28 CAPSULE | Refills: 0 | Status: ACTIVE | OUTPATIENT
Start: 2023-03-05 | End: 2023-03-12

## 2023-03-05 RX ORDER — CEPHALEXIN 500 MG/1
500 CAPSULE ORAL 4 TIMES DAILY
Qty: 28 CAPSULE | Refills: 0 | Status: ACTIVE | OUTPATIENT
Start: 2023-03-05 | End: 2023-03-05 | Stop reason: SDUPTHER

## 2023-03-05 RX ORDER — CEPHALEXIN 500 MG/1
500 CAPSULE ORAL ONCE
Status: COMPLETED | OUTPATIENT
Start: 2023-03-05 | End: 2023-03-05

## 2023-03-05 RX ORDER — ALBUTEROL SULFATE 90 UG/1
2 AEROSOL, METERED RESPIRATORY (INHALATION) EVERY 6 HOURS PRN
Qty: 8.5 G | Refills: 0 | Status: SHIPPED | OUTPATIENT
Start: 2023-03-05 | End: 2023-07-07

## 2023-03-05 RX ORDER — IPRATROPIUM BROMIDE AND ALBUTEROL SULFATE 2.5; .5 MG/3ML; MG/3ML
3 SOLUTION RESPIRATORY (INHALATION)
Status: DISCONTINUED | OUTPATIENT
Start: 2023-03-05 | End: 2023-03-05 | Stop reason: HOSPADM

## 2023-03-05 RX ORDER — ALBUTEROL SULFATE 90 UG/1
2 AEROSOL, METERED RESPIRATORY (INHALATION) ONCE
Status: COMPLETED | OUTPATIENT
Start: 2023-03-05 | End: 2023-03-05

## 2023-03-05 RX ADMIN — MUPIROCIN 2 %: 20 OINTMENT TOPICAL at 08:29

## 2023-03-05 RX ADMIN — CEPHALEXIN 500 MG: 500 CAPSULE ORAL at 08:28

## 2023-03-05 RX ADMIN — ALBUTEROL SULFATE 2 PUFF: 90 AEROSOL, METERED RESPIRATORY (INHALATION) at 08:21

## 2023-03-05 ASSESSMENT — LIFESTYLE VARIABLES
DO YOU DRINK ALCOHOL: NO
DOES PATIENT WANT TO STOP DRINKING: NO

## 2023-03-05 ASSESSMENT — PAIN DESCRIPTION - PAIN TYPE: TYPE: ACUTE PAIN
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
## 2023-03-05 NOTE — ED TRIAGE NOTES
Pt to triage   Chief Complaint   Patient presents with    Foot Pain     Getting worse this morning, started a wk ago.  Report possible wounds       Pt here earlier today and seen by Dr Lim, pt reports unable to leave d/t pain continuing and some dizziness.  Pt started on abx - Keflex(given dose at 828 this morning).  Pt Informed regarding triage process and verbalized understanding to inform triage tech or RN for any changes in condition. Placed in lobby.
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
## 2023-03-05 NOTE — ED NOTES
Pt provided d/c instructions. Pt denies any question. Instructed pt to change and pt would be wheeled out

## 2023-03-05 NOTE — ED NOTES
Patient has been provided dc instructions for foot pain. She has been escorted to the [facility] pharmacy on [address] and then to the regional bus stop with a bus pass. She has a wheel chair that she has been using and all her personal items are with her at time of DC.

## 2023-03-12 ENCOUNTER — HOSPITAL ENCOUNTER (EMERGENCY)
Facility: MEDICAL CENTER | Age: 38
End: 2023-03-12
Attending: EMERGENCY MEDICINE
Payer: MEDICAID

## 2023-03-12 ENCOUNTER — APPOINTMENT (OUTPATIENT)
Dept: RADIOLOGY | Facility: MEDICAL CENTER | Age: 38
End: 2023-03-12
Attending: EMERGENCY MEDICINE
Payer: MEDICAID

## 2023-03-12 VITALS
OXYGEN SATURATION: 91 % | BODY MASS INDEX: 38.98 KG/M2 | RESPIRATION RATE: 16 BRPM | DIASTOLIC BLOOD PRESSURE: 55 MMHG | WEIGHT: 227.07 LBS | TEMPERATURE: 98.5 F | HEART RATE: 98 BPM | SYSTOLIC BLOOD PRESSURE: 127 MMHG

## 2023-03-12 DIAGNOSIS — M79.605 BILATERAL LEG PAIN: ICD-10-CM

## 2023-03-12 DIAGNOSIS — F25.9 SCHIZOAFFECTIVE DISORDER, UNSPECIFIED TYPE (HCC): ICD-10-CM

## 2023-03-12 DIAGNOSIS — M79.604 BILATERAL LEG PAIN: ICD-10-CM

## 2023-03-12 DIAGNOSIS — Z59.00 HOMELESSNESS: ICD-10-CM

## 2023-03-12 DIAGNOSIS — R30.0 DYSURIA: ICD-10-CM

## 2023-03-12 LAB
ALBUMIN SERPL BCP-MCNC: 3.7 G/DL (ref 3.2–4.9)
ALBUMIN/GLOB SERPL: 1.2 G/DL
ALP SERPL-CCNC: 110 U/L (ref 30–99)
ALT SERPL-CCNC: 43 U/L (ref 2–50)
ANION GAP SERPL CALC-SCNC: 10 MMOL/L (ref 7–16)
AST SERPL-CCNC: 45 U/L (ref 12–45)
BASOPHILS # BLD AUTO: 0.5 % (ref 0–1.8)
BASOPHILS # BLD: 0.04 K/UL (ref 0–0.12)
BILIRUB SERPL-MCNC: 0.2 MG/DL (ref 0.1–1.5)
BUN SERPL-MCNC: 12 MG/DL (ref 8–22)
CALCIUM ALBUM COR SERPL-MCNC: 8.8 MG/DL (ref 8.5–10.5)
CALCIUM SERPL-MCNC: 8.6 MG/DL (ref 8.5–10.5)
CHLORIDE SERPL-SCNC: 104 MMOL/L (ref 96–112)
CO2 SERPL-SCNC: 26 MMOL/L (ref 20–33)
CREAT SERPL-MCNC: 0.69 MG/DL (ref 0.5–1.4)
EOSINOPHIL # BLD AUTO: 0.04 K/UL (ref 0–0.51)
EOSINOPHIL NFR BLD: 0.5 % (ref 0–6.9)
ERYTHROCYTE [DISTWIDTH] IN BLOOD BY AUTOMATED COUNT: 51.3 FL (ref 35.9–50)
GFR SERPLBLD CREATININE-BSD FMLA CKD-EPI: 115 ML/MIN/1.73 M 2
GLOBULIN SER CALC-MCNC: 3.2 G/DL (ref 1.9–3.5)
GLUCOSE SERPL-MCNC: 89 MG/DL (ref 65–99)
GRAM STN SPEC: NORMAL
HCG SERPL QL: NEGATIVE
HCT VFR BLD AUTO: 38.1 % (ref 37–47)
HGB BLD-MCNC: 12.3 G/DL (ref 12–16)
IMM GRANULOCYTES # BLD AUTO: 0.14 K/UL (ref 0–0.11)
IMM GRANULOCYTES NFR BLD AUTO: 1.6 % (ref 0–0.9)
LYMPHOCYTES # BLD AUTO: 1.96 K/UL (ref 1–4.8)
LYMPHOCYTES NFR BLD: 22.3 % (ref 22–41)
MCH RBC QN AUTO: 30.8 PG (ref 27–33)
MCHC RBC AUTO-ENTMCNC: 32.3 G/DL (ref 33.6–35)
MCV RBC AUTO: 95.3 FL (ref 81.4–97.8)
MONOCYTES # BLD AUTO: 0.47 K/UL (ref 0–0.85)
MONOCYTES NFR BLD AUTO: 5.3 % (ref 0–13.4)
NEUTROPHILS # BLD AUTO: 6.15 K/UL (ref 2–7.15)
NEUTROPHILS NFR BLD: 69.8 % (ref 44–72)
NRBC # BLD AUTO: 0 K/UL
NRBC BLD-RTO: 0 /100 WBC
PLATELET # BLD AUTO: 367 K/UL (ref 164–446)
PMV BLD AUTO: 9.2 FL (ref 9–12.9)
POTASSIUM SERPL-SCNC: 4.2 MMOL/L (ref 3.6–5.5)
PROT SERPL-MCNC: 6.9 G/DL (ref 6–8.2)
RBC # BLD AUTO: 4 M/UL (ref 4.2–5.4)
SIGNIFICANT IND 70042: NORMAL
SITE SITE: NORMAL
SODIUM SERPL-SCNC: 140 MMOL/L (ref 135–145)
SOURCE SOURCE: NORMAL
WBC # BLD AUTO: 8.8 K/UL (ref 4.8–10.8)

## 2023-03-12 PROCEDURE — 85025 COMPLETE CBC W/AUTO DIFF WBC: CPT

## 2023-03-12 PROCEDURE — 87186 SC STD MICRODIL/AGAR DIL: CPT

## 2023-03-12 PROCEDURE — 87077 CULTURE AEROBIC IDENTIFY: CPT

## 2023-03-12 PROCEDURE — 84703 CHORIONIC GONADOTROPIN ASSAY: CPT

## 2023-03-12 PROCEDURE — 700111 HCHG RX REV CODE 636 W/ 250 OVERRIDE (IP): Performed by: EMERGENCY MEDICINE

## 2023-03-12 PROCEDURE — 96374 THER/PROPH/DIAG INJ IV PUSH: CPT

## 2023-03-12 PROCEDURE — 87070 CULTURE OTHR SPECIMN AEROBIC: CPT

## 2023-03-12 PROCEDURE — 99284 EMERGENCY DEPT VISIT MOD MDM: CPT

## 2023-03-12 PROCEDURE — 36415 COLL VENOUS BLD VENIPUNCTURE: CPT

## 2023-03-12 PROCEDURE — 93970 EXTREMITY STUDY: CPT | Mod: 26,52,RT | Performed by: INTERNAL MEDICINE

## 2023-03-12 PROCEDURE — 93970 EXTREMITY STUDY: CPT

## 2023-03-12 PROCEDURE — 87205 SMEAR GRAM STAIN: CPT

## 2023-03-12 PROCEDURE — 80053 COMPREHEN METABOLIC PANEL: CPT

## 2023-03-12 RX ORDER — KETOROLAC TROMETHAMINE 30 MG/ML
30 INJECTION, SOLUTION INTRAMUSCULAR; INTRAVENOUS ONCE
Status: COMPLETED | OUTPATIENT
Start: 2023-03-12 | End: 2023-03-12

## 2023-03-12 RX ADMIN — KETOROLAC TROMETHAMINE 30 MG: 30 INJECTION, SOLUTION INTRAMUSCULAR at 19:15

## 2023-03-12 SDOH — ECONOMIC STABILITY - HOUSING INSECURITY: HOMELESSNESS UNSPECIFIED: Z59.00

## 2023-03-12 ASSESSMENT — PAIN DESCRIPTION - PAIN TYPE: TYPE: ACUTE PAIN

## 2023-03-12 NOTE — LETTER
3/17/2023               Yanetana Srivastavabarbie  [address] [facility] [address]        Dear Yane (MR#[id number])    As we have been unable to contact you by phone, this letter is sent in regards to your recent visit to the [facility] Emergency Department on 3/12/2023. During the visit, tests were performed to assist the physician in a medical diagnosis. A review of those tests requires that we notify you of the following:    Your wound culture and sensitivity was POSITIVE for a bacteria called Methicillin Resistant Staphylococcus aureus (MRSA), and further treatment may be necessary. The currently prescribed antibiotic (cephalexin) may not be effective in treating your infection. IF YOU ARE NOT FEELING BETTER PLEASE CONTACT ME AS SOON AS POSSIBLE AT THE NUMBER BELOW.       Thank you for your cooperation in the matter.    Sincerely,  ED Culture Follow-Up Staff  Flakito Schreiber, PharmD  [phone]    [facility] Emergency Department  [address] [place] [id number] [phone] (ED Culture Line)

## 2023-03-13 NOTE — ED NOTES
Patient dressed herself and ambulated independently with steady gait to lobby accompanied by security. Patient had all belongings in her possession and was given her copy of her AMA form.  PIV removed and catheter intact, bleeding controlled.

## 2023-03-13 NOTE — ED NOTES
Report from [facility]  Patient resting comfortably, resp even and unlabored, NAD, VSS.     Patient pending US LE, urine, and wound culture.    /60   Pulse 99   Temp 37 °C (98.6 °F) (Temporal)   Resp 18   Wt 103 kg (227 lb 1.2 oz)   SpO2 96%

## 2023-03-13 NOTE — ED PROVIDER NOTES
ED Provider Note    CHIEF COMPLAINT  Chief Complaint   Patient presents with    Leg Pain       EXTERNAL RECORDS REVIEWED  Inpatient Notes previous ER records    HPI/ROS  LIMITATION TO HISTORY   Select: Behavior  OUTSIDE HISTORIAN(S):  None    Yane Orellana is a 37 y.o. female who presents tonight with a chief complaint of bilateral lower extremity pain, dysuria.  Patient is a homeless person with psychiatric history difficult to obtain a history from.  She states that her legs have been getting progressively worse.  He denies any history of trauma, fever, chills.  She denies any flank pain, nausea or vomiting.    PAST MEDICAL HISTORY   has a past medical history of ASTHMA (), Chlamydia (2010), Fall (2010), Migraine, Other specified symptom associated with female genital organs, Psychiatric disorder (), Psychiatric problem, Recurrent UTI (), Schizoaffective disorder (), Substance abuse (), Urinary tract infection, and Vaginal yeast infections ().    SURGICAL HISTORY   has a past surgical history that includes gyn surgery.    FAMILY HISTORY  Family History   Problem Relation Age of Onset    Diabetes Mother     Psychiatric Illness Mother     Diabetes Maternal Grandmother     Diabetes Maternal Grandfather        SOCIAL HISTORY  Social History     Tobacco Use    Smoking status: Former     Types: Cigarettes     Quit date: 2018     Years since quittin.4    Smokeless tobacco: Never    Tobacco comments:     Vape 6 mg    Vaping Use    Vaping Use: Never used   Substance and Sexual Activity    Alcohol use: Yes     Comment: Occasionally    Drug use: Yes     Types: Marijuana     Comment: medical card     Sexual activity: Yes       CURRENT MEDICATIONS  Home Medications    **Home medications have not yet been reviewed for this encounter**         ALLERGIES  Allergies   Allergen Reactions    Geodon [Ziprasidone Hydrochloride] Anaphylaxis       PHYSICAL EXAM  VITAL SIGNS: /55   Pulse 98    Temp 36.9 °C (98.5 °F) (Temporal)   Resp 16   Wt 103 kg (227 lb 1.2 oz)   LMP 12/09/2022 (Within Weeks)   SpO2 91%   BMI 38.98 kg/m²    Constitutional: Patient is a very unkempt female with odd behavior complaining of bilateral lower extremity pain.  HENT: Normocephalic, atraumatic.  Nose normal with no mucosal edema or drainage. Oropharynx moist   Eyes: PERRL, EOMI, Conjunctiva without erythema   Neck: Supple  Lymphatic: No lymphadenopathy noted.   Cardiovascular: Normal heart rate and Regular rhythm. No murmur  Thorax & Lungs: Clear and equal breath sounds with good excursion. No respiratory distress, no rhonchi, wheezing or rales.   Abdomen: Bowel sounds normal in all four quadrants. Soft,nontender, obese.  No CVA tenderness.  Skin: Warm, Dry, No erythema, No rashes.   Back: No cervical, thoracic, or lumbosacral tenderness.   Extremities: Peripheral pulses 4/4 No edema, patient has multiple areas of linear abrasions and bruising but no increased erythema, warmth, open wounds.  She does have a healing wound on her left posterior ankle with some mildly purulent discharge and no surrounding erythema.  Neurovascular is intact.  Musculoskeletal: Normal range of motion in all major joints. No tenderness to palpation or major deformities noted.   Neurologic: Alert & oriented x 3, Normal motor function, Normal sensory function  Psychiatric: Affect very odd.    DIAGNOSTIC STUDIES / PROCEDURE    LABS  Results for orders placed or performed during the hospital encounter of 03/12/23   CBC WITH DIFFERENTIAL   Result Value Ref Range    WBC 8.8 4.8 - 10.8 K/uL    RBC 4.00 (L) 4.20 - 5.40 M/uL    Hemoglobin 12.3 12.0 - 16.0 g/dL    Hematocrit 38.1 37.0 - 47.0 %    MCV 95.3 81.4 - 97.8 fL    MCH 30.8 27.0 - 33.0 pg    MCHC 32.3 (L) 33.6 - 35.0 g/dL    RDW 51.3 (H) 35.9 - 50.0 fL    Platelet Count 367 164 - 446 K/uL    MPV 9.2 9.0 - 12.9 fL    Neutrophils-Polys 69.80 44.00 - 72.00 %    Lymphocytes 22.30 22.00 - 41.00 %     Monocytes 5.30 0.00 - 13.40 %    Eosinophils 0.50 0.00 - 6.90 %    Basophils 0.50 0.00 - 1.80 %    Immature Granulocytes 1.60 (H) 0.00 - 0.90 %    Nucleated RBC 0.00 /100 WBC    Neutrophils (Absolute) 6.15 2.00 - 7.15 K/uL    Lymphs (Absolute) 1.96 1.00 - 4.80 K/uL    Monos (Absolute) 0.47 0.00 - 0.85 K/uL    Eos (Absolute) 0.04 0.00 - 0.51 K/uL    Baso (Absolute) 0.04 0.00 - 0.12 K/uL    Immature Granulocytes (abs) 0.14 (H) 0.00 - 0.11 K/uL    NRBC (Absolute) 0.00 K/uL   COMP METABOLIC PANEL   Result Value Ref Range    Sodium 140 135 - 145 mmol/L    Potassium 4.2 3.6 - 5.5 mmol/L    Chloride 104 96 - 112 mmol/L    Co2 26 20 - 33 mmol/L    Anion Gap 10.0 7.0 - 16.0    Glucose 89 65 - 99 mg/dL    Bun 12 8 - 22 mg/dL    Creatinine 0.69 0.50 - 1.40 mg/dL    Calcium 8.6 8.5 - 10.5 mg/dL    AST(SGOT) 45 12 - 45 U/L    ALT(SGPT) 43 2 - 50 U/L    Alkaline Phosphatase 110 (H) 30 - 99 U/L    Total Bilirubin 0.2 0.1 - 1.5 mg/dL    Albumin 3.7 3.2 - 4.9 g/dL    Total Protein 6.9 6.0 - 8.2 g/dL    Globulin 3.2 1.9 - 3.5 g/dL    A-G Ratio 1.2 g/dL   HCG QUAL SERUM   Result Value Ref Range    Beta-Hcg Qualitative Serum Negative Negative   CULTURE WOUND W/ GRAM STAIN    Specimen: Left Ankle; Wound   Result Value Ref Range    Significant Indicator NEG     Source WND     Site LEFT ANKLE     Culture Result -     Gram Stain Result No organisms seen.    CORRECTED CALCIUM   Result Value Ref Range    Correct Calcium 8.8 8.5 - 10.5 mg/dL   ESTIMATED GFR   Result Value Ref Range    GFR (CKD-EPI) 115 >60 mL/min/1.73 m 2   GRAM STAIN    Specimen: Wound   Result Value Ref Range    Significant Indicator .     Source WND     Site LEFT ANKLE     Gram Stain Result No organisms seen.          RADIOLOGY  I have independently interpreted the diagnostic imaging associated with this visit and am waiting the final reading from the radiologist.   Patient was sent for ultrasound of her bilateral lower extremities but refused to have the studies done by  the ultrasound tech.  The patient refuses to have any more testing done therefore I have told her if she cannot comply with our testing that she would have to sign out AGAINST MEDICAL ADVICE.    COURSE & MEDICAL DECISION MAKING    ED Observation Status? No; Patient does not meet criteria for ED Observation.     INITIAL ASSESSMENT, COURSE AND PLAN  Care Narrative: Laboratories were drawn and found to be unremarkable.  Her left heel wound was cultured and revealed no organisms.  Electrolytes are unremarkable.  I ordered bilateral lower extremity ultrasounds secondary to her calf tenderness and swelling but patient refused to have any further testing done.  She will need to sign out AGAINST MEDICAL ADVICE as there is nothing further that I can do for her.  Patient refuses to leave the hospital therefore she was escorted out by security.        ADDITIONAL PROBLEM LIST  Schizoaffective disorder, homelessness  DISPOSITION AND DISCUSSIONS  I have discussed management of the patient with the following physicians and JONO's: None    Discussion of management with other QHP or appropriate source(s): none    Escalation of care considered, and ultimately not performed:acute inpatient care management, however at this time, the patient is most appropriate for outpatient management    Barriers to care at this time, including but not limited to: Patient does not have established PCP, Patient is homeless, Patient lacks transportation , Patient does not have insurance, Patient had difficult affording medications, and Patient lacks financial resources.     Decision tools and prescription drugs considered including, but not limited to: None.    FINAL DIAGNOSIS  1. Bilateral leg pain    2. Homelessness    3. Dysuria    4.  Schizoaffective disorder  5. AMA       Electronically signed by: Antoinette Rae D.O., 3/13/2023 1:29 AM

## 2023-03-13 NOTE — ED NOTES
OK for patient to have the CTs done at the same time. I put in a new order. Please make sure that the CT results are sent to the other ordering provider.  Thank you   US at bedside

## 2023-03-13 NOTE — ED NOTES
"Patient continues to be uncooperative with staff.  Security called for assistance with discharge as patient \"just wants to sleep.\"  "

## 2023-03-13 NOTE — DISCHARGE INSTRUCTIONS
Ibuprofen for pain as needed  Increase fluids  Follow-up with your primary care provider this week for recheck and return if problems

## 2023-03-13 NOTE — ED NOTES
Patient refusing to wake up and cooperate with staff and treatment. Patient rousable to touch but then turns head away from staff and goes back to sleep instead of interacting with staff and allowing care. ERP updated on patient refusal to cooperate, refusal of US, and refusal to provide urine sample. Patient to sign out AMA D/T continued refusal of care.

## 2023-03-13 NOTE — ED NOTES
Pt [facility] rem from the [facility]. Pt stating bilateral leg pain for quite some time with bruising and swelling noted. Pt reporting she was placed on antibiotics here last visit here and has finished them.

## 2023-03-14 LAB
BACTERIA WND AEROBE CULT: ABNORMAL
BACTERIA WND AEROBE CULT: ABNORMAL
GRAM STN SPEC: ABNORMAL
SIGNIFICANT IND 70042: ABNORMAL
SITE SITE: ABNORMAL
SOURCE SOURCE: ABNORMAL

## 2023-03-17 NOTE — ED NOTES
ED Positive Culture Follow-up/Notification Note:   Date: 3/17/23    Patient seen in the ED on 3/12/2023 for bilateral lower extremity pain. Patient is homeless. Patient has wound on left posterior ankle with some mildly purulent discharge and no surrounding erythema.    1. Bilateral leg pain    2. Homelessness    3. Dysuria    4. Schizoaffective disorder, unspecified type (HCC)      Discharge Medication List as of 3/12/2023  9:05 PM        Allergies: Geodon [ziprasidone hydrochloride]    Vitals:    03/12/23 1811 03/12/23 1837 03/12/23 1914 03/12/23 2002   BP:  131/63 134/60 127/55   Pulse:  (!) 102 99 98   Resp:   18 16   Temp:    36.9 °C (98.5 °F)   TempSrc:    Temporal   SpO2:  97% 96% 91%   Weight: 103 kg (227 lb 1.2 oz)          Final cultures:   Results       Procedure Component Value Units Date/Time    CULTURE WOUND W/ GRAM STAIN [[id number]]  (Abnormal)  (Susceptibility) Collected: 03/12/23 1925    Order Status: Completed Specimen: Wound from Left Ankle Updated: 03/14/23 1235     Significant Indicator POS     Source WND     Site LEFT ANKLE     Culture Result -     Gram Stain Result No organisms seen.     Culture Result Methicillin Resistant Staphylococcus aureus  Light growth      Susceptibility       Methicillin resistant staphylococcus aureus (1)       Antibiotic Interpretation Microscan   Method Status    Ampicillin  [*]  Resistant >8 mcg/mL KEVIN Final    Amoxicillin/CA  [*]  Resistant <=4/2 mcg/mL KEVIN Final    Azithromycin Resistant >4 mcg/mL KEVIN Final    Ceftriaxone  [*]  Resistant 8 mcg/mL KEVIN Final    Clindamycin Sensitive <=0.25 mcg/mL KEVIN Final    Cephalothin  [*]  Resistant <=8 mcg/mL KEVIN Final    Cefoxitin Screen  [*]  Positive >4 mcg/mL KEVIN Final    Cefazolin Resistant <=8 mcg/mL KEVIN Final    Ciprofloxacin  [*]  Resistant >2 mcg/mL KEVIN Final    Cefepime Resistant 8 mcg/mL KEVIN Final    Ceftaroline Sensitive <=0.5 mcg/mL KEVIN Final    Daptomycin Sensitive 1 mcg/mL KEVIN Final    Ampicillin/sulbactam  Resistant 16/8 mcg/mL KEVIN Final    Erythromycin Resistant >4 mcg/mL KEVIN Final    Vancomycin Sensitive 2 mcg/mL KEVIN Final    Gentamicin  [*]  Sensitive <=4 mcg/mL KEVIN Final    Inducible Clindamycin Test  [*]  Negative <=4/0.5 mcg/mL KEVIN Final    Imipenem  [*]  Resistant <=4 mcg/mL KEVIN Final    Levofloxacin  [*]  Resistant >4 mcg/mL KEVIN Final    Linezolid  [*]  Sensitive 4 mcg/mL KEVIN Final    Meropenem  [*]  Resistant <=2 mcg/mL KEVIN Final    Oxacillin Resistant >2 mcg/mL KEVIN Final    Rifampin  [*]  Sensitive <=1 mcg/mL KEVIN Final    Synercid  [*]  Sensitive <=0.5 mcg/mL KEVIN Final    Trimeth/Sulfa Sensitive <=0.5/9.5 mcg/mL KEVIN Final    Tetracycline Sensitive <=4 mcg/mL KEVIN Final    Tigecycline  [*]  Sensitive <=0.25 mcg/mL KEVIN Final               [*]  Suppressed Antibiotic                   GRAM STAIN [[id number]] Collected: 03/12/23 1925    Order Status: Completed Specimen: Wound Updated: 03/12/23 2233     Significant Indicator .     Source WND     Site LEFT ANKLE     Gram Stain Result No organisms seen.    URINALYSIS CULTURE, IF INDICATED [[id number]] Collected: 03/12/23 0000    Order Status: Canceled Specimen: Urine, Clean Catch             Plan:   Patient given Keflex four times daily. Isolated organism is resistant to prescribed therapy.  Unable to discuss culture result and change in antibiotics with patient as she does not have an active phone. Patient does have an address listed so will attempt to send a letter to address on file.   If patient does return phone call or return back to ED, recommend Bactrim DS twice daily x 7 days #14, no refills.    Heidi Beckett, PharmD

## 2023-05-15 ENCOUNTER — APPOINTMENT (OUTPATIENT)
Dept: RADIOLOGY | Facility: MEDICAL CENTER | Age: 38
End: 2023-05-15
Attending: EMERGENCY MEDICINE
Payer: COMMERCIAL

## 2023-05-15 ENCOUNTER — HOSPITAL ENCOUNTER (EMERGENCY)
Facility: MEDICAL CENTER | Age: 38
End: 2023-05-16
Attending: EMERGENCY MEDICINE
Payer: COMMERCIAL

## 2023-05-15 DIAGNOSIS — F29 PSYCHOSIS, UNSPECIFIED PSYCHOSIS TYPE (HCC): ICD-10-CM

## 2023-05-15 LAB
ALBUMIN SERPL BCP-MCNC: 4.6 G/DL (ref 3.2–4.9)
ALBUMIN/GLOB SERPL: 1.4 G/DL
ALP SERPL-CCNC: 75 U/L (ref 30–99)
ALT SERPL-CCNC: 35 U/L (ref 2–50)
AMMONIA PLAS-SCNC: 15 UMOL/L (ref 11–45)
ANION GAP SERPL CALC-SCNC: 18 MMOL/L (ref 7–16)
AST SERPL-CCNC: 44 U/L (ref 12–45)
BASOPHILS # BLD AUTO: 0.5 % (ref 0–1.8)
BASOPHILS # BLD: 0.05 K/UL (ref 0–0.12)
BILIRUB SERPL-MCNC: 1.6 MG/DL (ref 0.1–1.5)
BUN SERPL-MCNC: 21 MG/DL (ref 8–22)
CALCIUM ALBUM COR SERPL-MCNC: 9.1 MG/DL (ref 8.5–10.5)
CALCIUM SERPL-MCNC: 9.6 MG/DL (ref 8.5–10.5)
CHLORIDE SERPL-SCNC: 97 MMOL/L (ref 96–112)
CK SERPL-CCNC: 429 U/L (ref 0–154)
CO2 SERPL-SCNC: 21 MMOL/L (ref 20–33)
CREAT SERPL-MCNC: 0.79 MG/DL (ref 0.5–1.4)
EOSINOPHIL # BLD AUTO: 0.12 K/UL (ref 0–0.51)
EOSINOPHIL NFR BLD: 1.2 % (ref 0–6.9)
ERYTHROCYTE [DISTWIDTH] IN BLOOD BY AUTOMATED COUNT: 49.9 FL (ref 35.9–50)
ETHANOL BLD-MCNC: <10.1 MG/DL
GFR SERPLBLD CREATININE-BSD FMLA CKD-EPI: 99 ML/MIN/1.73 M 2
GLOBULIN SER CALC-MCNC: 3.4 G/DL (ref 1.9–3.5)
GLUCOSE SERPL-MCNC: 78 MG/DL (ref 65–99)
HCG SERPL QL: NEGATIVE
HCT VFR BLD AUTO: 41.9 % (ref 37–47)
HGB BLD-MCNC: 13.8 G/DL (ref 12–16)
IMM GRANULOCYTES # BLD AUTO: 0.03 K/UL (ref 0–0.11)
IMM GRANULOCYTES NFR BLD AUTO: 0.3 % (ref 0–0.9)
LYMPHOCYTES # BLD AUTO: 3.09 K/UL (ref 1–4.8)
LYMPHOCYTES NFR BLD: 32.2 % (ref 22–41)
MCH RBC QN AUTO: 30.7 PG (ref 27–33)
MCHC RBC AUTO-ENTMCNC: 32.9 G/DL (ref 33.6–35)
MCV RBC AUTO: 93.3 FL (ref 81.4–97.8)
MONOCYTES # BLD AUTO: 0.85 K/UL (ref 0–0.85)
MONOCYTES NFR BLD AUTO: 8.8 % (ref 0–13.4)
NEUTROPHILS # BLD AUTO: 5.47 K/UL (ref 2–7.15)
NEUTROPHILS NFR BLD: 57 % (ref 44–72)
NRBC # BLD AUTO: 0 K/UL
NRBC BLD-RTO: 0 /100 WBC
PLATELET # BLD AUTO: 433 K/UL (ref 164–446)
PMV BLD AUTO: 9.1 FL (ref 9–12.9)
POTASSIUM SERPL-SCNC: 3.6 MMOL/L (ref 3.6–5.5)
PROT SERPL-MCNC: 8 G/DL (ref 6–8.2)
RBC # BLD AUTO: 4.49 M/UL (ref 4.2–5.4)
SODIUM SERPL-SCNC: 136 MMOL/L (ref 135–145)
WBC # BLD AUTO: 9.6 K/UL (ref 4.8–10.8)

## 2023-05-15 PROCEDURE — 84703 CHORIONIC GONADOTROPIN ASSAY: CPT

## 2023-05-15 PROCEDURE — 96372 THER/PROPH/DIAG INJ SC/IM: CPT

## 2023-05-15 PROCEDURE — 70450 CT HEAD/BRAIN W/O DYE: CPT

## 2023-05-15 PROCEDURE — 99285 EMERGENCY DEPT VISIT HI MDM: CPT

## 2023-05-15 PROCEDURE — 80053 COMPREHEN METABOLIC PANEL: CPT

## 2023-05-15 PROCEDURE — 85025 COMPLETE CBC W/AUTO DIFF WBC: CPT

## 2023-05-15 PROCEDURE — 96374 THER/PROPH/DIAG INJ IV PUSH: CPT

## 2023-05-15 PROCEDURE — 82550 ASSAY OF CK (CPK): CPT

## 2023-05-15 PROCEDURE — 36415 COLL VENOUS BLD VENIPUNCTURE: CPT

## 2023-05-15 PROCEDURE — 82077 ASSAY SPEC XCP UR&BREATH IA: CPT

## 2023-05-15 PROCEDURE — 96376 TX/PRO/DX INJ SAME DRUG ADON: CPT

## 2023-05-15 PROCEDURE — 700111 HCHG RX REV CODE 636 W/ 250 OVERRIDE (IP): Performed by: EMERGENCY MEDICINE

## 2023-05-15 PROCEDURE — 82140 ASSAY OF AMMONIA: CPT

## 2023-05-15 PROCEDURE — 90791 PSYCH DIAGNOSTIC EVALUATION: CPT

## 2023-05-15 RX ORDER — DIPHENHYDRAMINE HYDROCHLORIDE 50 MG/ML
25 INJECTION INTRAMUSCULAR; INTRAVENOUS ONCE
Status: DISCONTINUED | OUTPATIENT
Start: 2023-05-15 | End: 2023-05-15

## 2023-05-15 RX ORDER — DIPHENHYDRAMINE HYDROCHLORIDE 50 MG/ML
50 INJECTION INTRAMUSCULAR; INTRAVENOUS ONCE
Status: COMPLETED | OUTPATIENT
Start: 2023-05-15 | End: 2023-05-15

## 2023-05-15 RX ORDER — LORAZEPAM 2 MG/ML
2 INJECTION INTRAMUSCULAR ONCE
Status: COMPLETED | OUTPATIENT
Start: 2023-05-15 | End: 2023-05-15

## 2023-05-15 RX ORDER — LORAZEPAM 2 MG/ML
1 INJECTION INTRAMUSCULAR ONCE
Status: COMPLETED | OUTPATIENT
Start: 2023-05-15 | End: 2023-05-15

## 2023-05-15 RX ORDER — HALOPERIDOL 5 MG/ML
5 INJECTION INTRAMUSCULAR ONCE
Status: COMPLETED | OUTPATIENT
Start: 2023-05-15 | End: 2023-05-15

## 2023-05-15 RX ADMIN — DIPHENHYDRAMINE HYDROCHLORIDE 50 MG: 50 INJECTION, SOLUTION INTRAMUSCULAR; INTRAVENOUS at 16:37

## 2023-05-15 RX ADMIN — HALOPERIDOL LACTATE 5 MG: 5 INJECTION, SOLUTION INTRAMUSCULAR at 16:37

## 2023-05-15 RX ADMIN — LORAZEPAM 1 MG: 2 INJECTION INTRAMUSCULAR; INTRAVENOUS at 13:57

## 2023-05-15 RX ADMIN — LORAZEPAM 2 MG: 2 INJECTION INTRAMUSCULAR; INTRAVENOUS at 16:37

## 2023-05-15 ASSESSMENT — FIBROSIS 4 INDEX: FIB4 SCORE: 0.67

## 2023-05-15 NOTE — ED NOTES
Pt is more erratic with tangential speech, paranoid, and verbally aggressive.  Pt attempting to leave.  ERP aware.

## 2023-05-15 NOTE — ED PROVIDER NOTES
ER Provider Note    Scribed for Irina Dominguez M.d. by Lilly Mcgovern. 5/15/2023  12:13 PM    Primary Care Provider: Oscar Hernandez M.D.    CHIEF COMPLAINT  Chief Complaint   Patient presents with    Mental Status Change     Pt was found laying in the bushes by [facility] security.     EXTERNAL RECORDS REVIEWED  Outpatient Notes Review of records show the patient is homeless and has a psychiatric history . She was last seen at [facility] end of March for urinary frequency and psychichtric complaints. She was non compliant with staff. She has 10 times to either [facility] or [facility] this year.      HPI/ROS  LIMITATION TO HISTORY   Select: Altered mental status / Confusion and uncooperative   OUTSIDE HISTORIAN(S):  None    Yane Orellana is a 37 y.o. female who presents to the ED after being found sleeping in a bush by a [facility] . Patient was uncooperative and altered during the exam, making it difficult to assess her. When asked is she took any drugs she began talking about stuff that was unrelated and not understandable.    PAST MEDICAL HISTORY  Past Medical History:   Diagnosis Date    ASTHMA 2006    Chlamydia 2010    Fall 2010    1 week ago fell off a horse    Migraine     Other specified symptom associated with female genital organs      ab2    Psychiatric disorder 2008    Borderline personality disorder    Psychiatric problem     Bipolar and schizophrenia in family    Recurrent UTI 2010    Schizoaffective disorder (HCC)     Substance abuse (HCC)     Urinary tract infection     Recurrent UTI's 6-9 per year    Vaginal yeast infections 2010       SURGICAL HISTORY  Past Surgical History:   Procedure Laterality Date    GYN SURGERY      abortions x2       FAMILY HISTORY  Family History   Problem Relation Age of Onset    Diabetes Mother     Psychiatric Illness Mother     Diabetes Maternal Grandmother     Diabetes Maternal Grandfather        SOCIAL HISTORY   reports that she quit smoking about 4  years ago. Her smoking use included cigarettes. She has never used smokeless tobacco. She reports current alcohol use. She reports current drug use. Drug: Marijuana.    CURRENT MEDICATIONS  Previous Medications    ALBUTEROL 108 (90 BASE) MCG/ACT AERO SOLN INHALATION AEROSOL    Inhale 2 Puffs every 6 hours as needed for Shortness of Breath.    ALBUTEROL 108 (90 BASE) MCG/ACT AERO SOLN INHALATION AEROSOL    Inhale 2 Puffs every 6 hours as needed for Shortness of Breath.    ALPRAZOLAM (XANAX) 0.5 MG TAB    Take 0.5 mg by mouth 1 time daily as needed.    AMPHETAMINE-DEXTROAMPHETAMINE (ADDERALL) 10 MG TAB    TAKE 1 TABLET BY MOUTH TWICE A DAY    BENZOCAINE-MENTHOL (CEPACOL) 15-3.6 MG LOZENGE    Dissolve 1 Lozenge in the mouth every 2 hours as needed (cough).    BUPROPION (WELLBUTRIN XL) 150 MG XL TABLET    Take 150 mg by mouth every morning.    ERYTHROMYCIN 5 MG/GM OINTMENT    Apply 1 Application to both eyes at bedtime.    GABAPENTIN (NEURONTIN) 300 MG CAP    Take 300 mg by mouth 3 times a day. 200 in the AM  100 in the PM    NAPROXEN (NAPROSYN) 500 MG TAB    Take 500 mg by mouth 2 times a day, with meals.    NON SPECIFIED    Take 1 Cap by mouth 2 Times a Day. TERRAZYME    NON SPECIFIED    Take 1 Cap by mouth every day. ALPHA CRS+    ONDANSETRON (ZOFRAN ODT) 4 MG TABLET DISPERSIBLE    Take 1 Tab by mouth every 6 hours as needed for Nausea.    PHENAZOPYRIDINE (PYRIDIUM) 200 MG TAB    Take 1 Tab by mouth 3 times a day as needed.    PROPRANOLOL LA (INDERAL LA) 60 MG CAPSULE SR 24 HR    Take 1 Cap by mouth every day.       ALLERGIES  Geodon [ziprasidone hydrochloride]    PHYSICAL EXAM  /78   Pulse (!) 120   Temp 36.2 °C (97.1 °F)   Resp 20   Wt 99.8 kg (220 lb)   SpO2 94%   BMI 37.76 kg/m²     Constitutional: Well developed, well nourished; No acute distress; Non-toxic appearance. Disheveled, unkempt, fidgety and restless  HENT: Normocephalic, atraumatic; Bilateral external ears normal; oropharyngeal examination  deferred due to COVID-19 outbreak and lack of oropharyngeal complaint.  Eyes: PERRL, EOMI, Conjunctiva normal. No discharge.   Neck:  Supple, nontender midline; No stridor; No nuchal rigidity.   Lymphatic: No cervical lymphadenopathy noted.   Cardiovascular: Regular rate and rhythm without murmurs, rubs, or gallop.   Thorax & Lungs: No respiratory distress, breath sounds clear to auscultation bilaterally without wheezing, rales or rhonchi. Nontender chest wall. No crepitus or subcutaneous air  Abdomen: Soft, nontender, bowel sounds normal. No obvious masses; No pulsatile masses; no rebound, guarding, or peritoneal signs.   Skin: Good color; warm and dry without rash or petechia.  Back: Nontender, No CVA tenderness.   Extremities: Distal radial, dorsalis pedis, posterior tibial pulses are equal bilaterally; No edema; Nontender calves or saphenous, No cyanosis, No clubbing.   Musculoskeletal: Good range of motion in all major joints. No tenderness to palpation or major deformities noted.   Neurologic: Alert & awake, clear speech.  Normal gait.  Will not answer orientation questions.  Psychiatric: Patient is fidgety.  She is restless.  She is disorganized.  Tangential speech.  She is paranoid.  She is pacing around the room.  She is not directable.    DIAGNOSTIC STUDIES    Labs:   Results for orders placed or performed during the hospital encounter of 05/15/23   CBC WITH DIFFERENTIAL   Result Value Ref Range    WBC 9.6 4.8 - 10.8 K/uL    RBC 4.49 4.20 - 5.40 M/uL    Hemoglobin 13.8 12.0 - 16.0 g/dL    Hematocrit 41.9 37.0 - 47.0 %    MCV 93.3 81.4 - 97.8 fL    MCH 30.7 27.0 - 33.0 pg    MCHC 32.9 (L) 33.6 - 35.0 g/dL    RDW 49.9 35.9 - 50.0 fL    Platelet Count 433 164 - 446 K/uL    MPV 9.1 9.0 - 12.9 fL    Neutrophils-Polys 57.00 44.00 - 72.00 %    Lymphocytes 32.20 22.00 - 41.00 %    Monocytes 8.80 0.00 - 13.40 %    Eosinophils 1.20 0.00 - 6.90 %    Basophils 0.50 0.00 - 1.80 %    Immature Granulocytes 0.30 0.00 -  0.90 %    Nucleated RBC 0.00 /100 WBC    Neutrophils (Absolute) 5.47 2.00 - 7.15 K/uL    Lymphs (Absolute) 3.09 1.00 - 4.80 K/uL    Monos (Absolute) 0.85 0.00 - 0.85 K/uL    Eos (Absolute) 0.12 0.00 - 0.51 K/uL    Baso (Absolute) 0.05 0.00 - 0.12 K/uL    Immature Granulocytes (abs) 0.03 0.00 - 0.11 K/uL    NRBC (Absolute) 0.00 K/uL   COMP METABOLIC PANEL   Result Value Ref Range    Sodium 136 135 - 145 mmol/L    Potassium 3.6 3.6 - 5.5 mmol/L    Chloride 97 96 - 112 mmol/L    Co2 21 20 - 33 mmol/L    Anion Gap 18.0 (H) 7.0 - 16.0    Glucose 78 65 - 99 mg/dL    Bun 21 8 - 22 mg/dL    Creatinine 0.79 0.50 - 1.40 mg/dL    Calcium 9.6 8.5 - 10.5 mg/dL    AST(SGOT) 44 12 - 45 U/L    ALT(SGPT) 35 2 - 50 U/L    Alkaline Phosphatase 75 30 - 99 U/L    Total Bilirubin 1.6 (H) 0.1 - 1.5 mg/dL    Albumin 4.6 3.2 - 4.9 g/dL    Total Protein 8.0 6.0 - 8.2 g/dL    Globulin 3.4 1.9 - 3.5 g/dL    A-G Ratio 1.4 g/dL   DIAGNOSTIC ALCOHOL   Result Value Ref Range    Diagnostic Alcohol <10.1 <10.1 mg/dL   AMMONIA   Result Value Ref Range    Ammonia 15 11 - 45 umol/L   HCG QUAL SERUM   Result Value Ref Range    Beta-Hcg Qualitative Serum Negative Negative   CREATINE KINASE   Result Value Ref Range    CPK Total 429 (H) 0 - 154 U/L   CORRECTED CALCIUM   Result Value Ref Range    Correct Calcium 9.1 8.5 - 10.5 mg/dL   ESTIMATED GFR   Result Value Ref Range    GFR (CKD-EPI) 99 >60 mL/min/1.73 m 2        Radiology:   The attending emergency physician has independently interpreted the diagnostic imaging associated with this visit and am waiting the final reading from the radiologist.     Preliminary interpretation is a follows: ER MD is reviewed the CT brain.  No obvious head bleed.    Radiologist interpretation:   CT-HEAD W/O   Final Result      Head CT without contrast within normal limits. No evidence of acute cerebral infarction, hemorrhage or mass lesion.           COURSE & MEDICAL DECISION MAKING     ED Observation Status? Yes; I am  placing the patient in to an observation status due to a diagnostic uncertainty as well as therapeutic intensity. Patient placed in observation status at 1:25 PM, 5/15/2023.     Observation plan is as follows: Patient was placed in ED observation status as she will need prolonged period of observation for behavioral disturbance and possible psychosis.      INITIAL ASSESSMENT, COURSE AND PLAN  Care Narrative: Patient presents to the ER with altered mental status after she was found [place] the bushes [place] [facility] property.  Patient has history of schizoaffective disorder.  She also has history of methamphetamine abuse.  When asked if she uses methamphetamine she was very vague in her answers and actually talked in circles to try to avoid the question.  She acted paranoid.  She was fidgety and restless.  She was very disorganized.  She was tangential in her thoughts.  She denied being in any pain.  She underwent comprehensive work-up for altered mental status including a CT brain which was negative for any bleed.  Labs are unremarkable.  She is not intoxicated.  We are still awaiting a urine specimen for urine drug screen at the time of this dictation.  However, her behavior is highly suspicious for methamphetamine use.  Patient attempted to elope from the emergency department.  I went to evaluate her as she was trying to elope and she would not answer any orientation questions.  At this point she was placed on a legal 2000 for psychosis, failure to care for self, disorganized behavior and paranoia.  Vitals are stable.  Lab work is unremarkable.  Physical examination is essentially normal.  At this time I think she is medically cleared for psychiatric evaluation.  She has been seeing the behavioral health team and they feel she needs inpatient evaluation as well.  I completed and certified legal 2000.  At this time patient is awaiting transfer to psychiatric facility.    1:25 PM - Patient seen and examined at bedside. I  medicated with ativan 1 mg to treat. Ordered for labs and imaging to evaluate her symptoms.      4:08 PM - Patient is unable to answer any orienting questions. Her thoughts are disorganized and she is tangential, appears to be in psychosis. She was placed on legal 2000 for failure to care for self and psychosis. She will be given a B52    ADDITIONAL PROBLEM LIST  Problem #1: Altered mental status    DISPOSITION AND DISCUSSIONS  I have discussed management of the patient with the following physicians and JONO's:  none    Discussion of management with other [facility] or appropriate source(s): Behavioral Health    --behavioral health has evaluated patient.  They feel she needs inpatient psychiatric evaluation    Escalation of care considered, and ultimately not performed: IV fluids.  Patient is not hypotensive.  She is not tachycardic.  She is not septic or toxic.  She is not vomiting.  She tolerating p.o.  At this time no IV fluids needed.    Barriers to care at this time, including but not limited to: Patient is homeless.     Decision tools and prescription drugs considered including, but not limited to:  Patient was administered a B-52 as she was becoming very agitated and was threatening to leave.  She was not answering orientation questions and appeared to be very paranoid and disorganized in her thoughts. .    FINAL DIANGOSIS  1. Psychosis, unspecified psychosis type (HCC) Acute        Lilly REDMAN (Jaycee), am scribing for, and in the presence of, Irina Dominguez M.D..    Electronically signed by: Lilly Baires), 5/15/2023    Irina REDMAN M.D. personally performed the services described in this documentation, as scribed by Lilly Mcgovern in my presence, and it is both accurate and complete.      The note accurately reflects work and decisions made by me.  Irina Dominguez M.D.  5/15/2023  10:13 PM

## 2023-05-15 NOTE — ED NOTES
Pt not very directable- asked multiple times to pee in cup when she went to the restroom, stood by and watched and continued asking her to please pee in the cup. Pt continued to hold cup and urinate in toilet. Pt tried scooping urine out of toilet. Pt directed back to room.

## 2023-05-15 NOTE — ED TRIAGE NOTES
Chief Complaint   Patient presents with    Mental Status Change     Pt was found laying in the bushes by [facility] security.     Pt BIB [facility] for above complaint. Pt is disheveled with erratic behavior.  Pt requires multiple prompts to answer questions. Pt is A&O X 2 (oriented to self and place).  Pt placed on monitor  Chart up for ERP

## 2023-05-15 NOTE — ED NOTES
Pt medicated per MAR for agitation.    Attempting to place pt in gown. Pt refuses to take off her clothing.

## 2023-05-16 VITALS
WEIGHT: 220 LBS | BODY MASS INDEX: 37.76 KG/M2 | HEART RATE: 97 BPM | OXYGEN SATURATION: 96 % | TEMPERATURE: 97.1 F | DIASTOLIC BLOOD PRESSURE: 80 MMHG | SYSTOLIC BLOOD PRESSURE: 118 MMHG | RESPIRATION RATE: 20 BRPM

## 2023-05-16 NOTE — ED NOTES
Patient resting on stretcher in no acute distress. Equal chest rise and fall noted. Sitter in doorway for direct 1:1 observation. Room safety checklist in use. No needs at this time.

## 2023-05-16 NOTE — ED NOTES
Attempted to wake pt up to get UA.Pt drowsy and continues to fall asleep while this RN talks to her. Will attempt for UA again soon.

## 2023-05-16 NOTE — ED NOTES
Report to [facility]. All questions and concerns addressed at this time. Pt will be transported to [facility] at 0300 by [facility]

## 2023-05-16 NOTE — DISCHARGE PLANNING
[facility] ED Behavioral Health Fax Referral      [facility] ED Behavioral Health Alert Team:  [phone]    Referral: Legal Hold    Intervention: Patient referral to [facility] inpatient  facillity    Legal Hold Initiated: Date: 05/15/23 Time: 2015    Patient’s Insurance Listed on Face Sheet: [place] Medicaid    Referrals sent to: [facility], [facility]    Referrals faxed by Rj Huber RN, [facility]    This referral contains the following information:  Face sheet _x___  Page 1 and Page 2 of Legal Hold __x__  Alert Team Assessment/Psych Assessment _x___  Head to toe physical exam _x___  Urine Drug Screen __x__  Blood Alcohol __x__  Vital signs __x__  Pregnancy test when applicable _x__  Medications list ____  Covid screening ____    Plan: Patient will transfer to mental health facility once acceptance is obtained

## 2023-05-16 NOTE — CONSULTS
Alert Team:     Referral: Adult Patient Transfer to Mental Health Facility     Intervention: Received call from Myrna at [facility] stating that Dr. Meier has accepted the patient for admission.  Facility requests that transport be arranged for 0300.     Arranged for transportation to be set up through [facility] with [facility]TM for [facility] transport.     The pt will be picked up at 0300.      Notified the RN of the departure time as well as accepting facility.      Created transfer packet and placed on chart. ([place] completed)     Plan: Pt will transfer to [facility] at 0300.     IP Psychiatry consult cancelled

## 2023-05-16 NOTE — ED NOTES
Pt resting quietly with eyes closed, full even resp noted. Sitter remains outside of room for observation.

## 2023-05-16 NOTE — ED NOTES
Report to EMS. Pt to be transported to [facility] now. Pt ambulates with a steady gait to the ambulance. Belongings removed from locker and sent with EMS

## 2023-05-16 NOTE — ED NOTES
Bedside report from [facility]. Pt on L2K with 1:1 sitter in doorway. Pt on monitor with all other un-necessary medical equipment out of room

## 2023-05-16 NOTE — CONSULTS
Name: Yane Orellana  MRN: [id number]  : 1985  Age: 37 y.o.  Date of assessment: 5/15/2023  PCP: Oscar Hernandez M.D.  Persons in attendance: Patient  Patient Location: [facility]    CHIEF COMPLAINT/PRESENTING ISSUE (as stated by pt):   Chief Complaint   Patient presents with    Mental Status Change     Pt was found laying in the bushes by [facility].    PT is a 37 year old female BIB by [facility] after she was found in the bushes outside of [facility]. When patient was initially found by security, pt presented with disorganized thoughts, erratic behavior, tangential speech, exhibited paranoid thoughts, was verbally aggressive, and only A + O x 2 (self and place).  Pt mostly could not follow any directions and and was observed scooping urine out of toilet when asked for urine. Due to patients aggressive behavior, pt given haldol 5mg OM, Ativan 2mg IM, and Benadryl 50mg IM. At time of assessment, pt too somnolent to participate in assessment and majority of assessment obtained from history. Pt has  been seen in [facility] ED on eight separate occasions since 2023 for various medical complaints including, UTI symptoms, foot pain and a chronic cough. PT has a past medical history of Schizoaffective disorder (), ASTHMA (), Chlamydia (2010), Fall (2010), Migraine, Other specified symptom associated with female genital organs, Psychiatric disorder (), Psychiatric problem, Recurrent UTI (), Schizoaffective disorder (), Substance abuse (), Urinary tract infection, and Vaginal yeast infections (). Unclear if patient is currently prescribed any medications and also unclear if she has been engaged with any inpatient/outpatient mental health treatment. Given patient presentation, both writer and ERP feel patient meets inpatient criteria d/t not being able to care for self.     CURRENT LIVING SITUATION/SOCIAL SUPPORT/FINANCIAL RESOURCES: Pt is homeless and not  employed.    BEHAVIORAL HEALTH/SUBSTANCE USE TREATMENT HISTORY  Does patient/parent report a history of prior behavioral health/substance use treatment for patient?     Unknown. Pt too somnolent to participate in assessment.    SAFETY ASSESSMENT - SELF  Does patient acknowledge current or past symptoms of dangerousness to self or is previous history noted? Unknown. Pt too somnolent to participate in assessment.  Does parent/significant other report patient has current or past symptoms of dangerousness to self? Unknown. Pt too somnolent to participate in assessment.  Does presenting problem suggest symptoms of dangerousness to self? Unknown. Pt too somnolent to participate in assessment.    SAFETY ASSESSMENT - OTHERS  Does patient acknowledge current or past symptoms of aggressive behavior or risk to others or is previous history noted? Unknown. Pt too somnolent to participate in assessment.  Does parent/significant other report patient has current or past symptoms of aggressive behavior or risk to others?  Unknown. Pt too somnolent to participate in assessment.  Does presenting problem suggest symptoms of dangerousness to others? Unknown. Pt too somnolent to participate in assessment.    LEGAL HISTORY  Does patient acknowledge history of arrest/snf/FDC or is previous history noted? Unknown. Pt too somnolent to participate in assessment.    Crisis Safety Plan completed and copy given to patient? N\A    ABUSE/NEGLECT SCREENING  Does patient report feeling “unsafe” in his/her home, or afraid of anyone?  Unknown. Pt too somnolent to participate in assessment.  Does patient report any history of physical, sexual, or emotional abuse?  Unknown. Pt too somnolent to participate in assessment.  Does parent or significant other report any of the above? Unknown. Pt too somnolent to participate in assessment.  Is there evidence of neglect by self?  Unknown. Pt too somnolent to participate in assessment.  Is there evidence of  "neglect by a caregiver? Unknown. Pt too somnolent to participate in assessment.  Does the patient/parent report any history of CPS/APS/police involvement related to suspected abuse/neglect or domestic violence? Unknown. Pt too somnolent to participate in assessment.  Based on the information provided during the current assessment, is a mandated report of suspected abuse/neglect being made?  Schizoaffective disorder (HCC),    SUBSTANCE USE SCREENING  Yes:  Andres all substances used in the past 30 days:      Last Use Amount   []   Alcohol     []   Marijuana     []   Heroin     []   Prescription Opioids  (used without prescription, for    recreation, or in excess of prescribed amount)     []   Other Prescription  (used without prescription, for    recreation, or in excess of prescribed amount)     []   Cocaine      []   Methamphetamine     []   \"\" drugs (ectasy, MDMA)     []   Other substances        UDS results: pending   Breathalyzer results: pending        MENTAL STATUS   Participation: Defensive  Grooming: Disheveled  Orientation: Disoriented to: situation, time  Behavior: Agitated, Tense, and Aggressive  Eye contact: Limited  Mood: Angry, Manic, and Irritable  Affect: Labile  Thought process: Tangential  Thought content: Evidence of delusion  Speech: Loud and Hypertalkative  Perception: Derealization  Memory:  Recent:  Poor  Insight: Poor  Judgment:  Poor  Other:    Collateral information:   Source:  [] Significant other present in person:   [] Significant other by telephone  [] [facility]   [x] [facility] Nursing Staff  [x] [facility] Medical Record  [] Other:     [] Unable to complete full assessment due to:  [] Acute intoxication  [] Patient declined to participate/engage  [] Patient verbally unresponsive  [] Significant cognitive deficits  [] Significant perceptual distortions or behavioral disorganization  [] Other:      CLINICAL IMPRESSIONS:  Primary:  Psychosis   Secondary:         IDENTIFIED " NEEDS/PLAN:  [Trigger DISPOSITION list for any items marked]    [x]  Imminent safety risk - self [] Imminent safety risk - others   [x]  Acute substance withdrawal [x]  Psychosis/Impaired reality testing   [x]  Mood/anxiety []  Substance use/Addictive behavior   [x]  Maladaptive behaviro []  Parent/child conflict   []  Family/Couples conflict []  Biomedical   [x]  Housing []  Financial   []   Legal  Occupational/Educational   []  Domestic violence []  Other:     Recommended Plan of Care:  Actively being addressed by [facility], Refer to Legal [facility], [facility], [facility], and [facility], 1:1 Observation, and L2K with d/t inability to care for self. [place] Medicaid Insurance   *Telesitter may not be utilized for moderate or high risk patients    Has the Recommended Plan of Care/Level of Observation been reviewed with the patient's assigned nurse? yes    Does patient/parent or guardian express agreement with the above plan? yes    Referral appointment(s) scheduled? N\A    Alert team only: PT to be transferred to inpatient.   I have discussed findings and recommendations with Dr. Dominguez who is in agreement with these recommendations.     Referral information sent to the following community providers : NA    If applicable : Referred  to : BERTHA for legal hold follow up at (time): BERTHA Huber R.N., [facility]

## 2023-05-16 NOTE — DISCHARGE SUMMARY
ED Observation Discharge Summary    Patient:Yane Orellana  Patient : 1985  Patient MRN: [id number]  Patient PCP: Oscar Hernandez M.D.    Admit Date: 5/15/2023  Discharge Date and Time: 23 4:46 AM  Discharge Diagnosis: Psychosis  Discharge Attending: Yehuda Lim M.D.  Discharge Service: ED Observation    ED Course  Yane is a 37 y.o. female who was evaluated at [facility] patient was observed in the ER.  She remained stable she had no medical complaints.  She was excepted to behavioral health and transported without issue    Discharge Exam:  /80   Pulse 97   Temp 36.2 °C (97.1 °F)   Resp 20   Wt 99.8 kg (220 lb)   SpO2 96%   BMI 37.76 kg/m² .    Constitutional: Awake and alert. Nontoxic  HENT:  Grossly normal  Eyes: Grossly normal  Neck: Normal range of motion  Cardiovascular: Normal heart rate   Thorax & Lungs: No respiratory distress      Labs  Results for orders placed or performed during the hospital encounter of 05/15/23   CBC WITH DIFFERENTIAL   Result Value Ref Range    WBC 9.6 4.8 - 10.8 K/uL    RBC 4.49 4.20 - 5.40 M/uL    Hemoglobin 13.8 12.0 - 16.0 g/dL    Hematocrit 41.9 37.0 - 47.0 %    MCV 93.3 81.4 - 97.8 fL    MCH 30.7 27.0 - 33.0 pg    MCHC 32.9 (L) 33.6 - 35.0 g/dL    RDW 49.9 35.9 - 50.0 fL    Platelet Count 433 164 - 446 K/uL    MPV 9.1 9.0 - 12.9 fL    Neutrophils-Polys 57.00 44.00 - 72.00 %    Lymphocytes 32.20 22.00 - 41.00 %    Monocytes 8.80 0.00 - 13.40 %    Eosinophils 1.20 0.00 - 6.90 %    Basophils 0.50 0.00 - 1.80 %    Immature Granulocytes 0.30 0.00 - 0.90 %    Nucleated RBC 0.00 /100 WBC    Neutrophils (Absolute) 5.47 2.00 - 7.15 K/uL    Lymphs (Absolute) 3.09 1.00 - 4.80 K/uL    Monos (Absolute) 0.85 0.00 - 0.85 K/uL    Eos (Absolute) 0.12 0.00 - 0.51 K/uL    Baso (Absolute) 0.05 0.00 - 0.12 K/uL    Immature Granulocytes (abs) 0.03 0.00 - 0.11 K/uL    NRBC (Absolute) 0.00 K/uL   COMP METABOLIC PANEL   Result Value Ref Range    Sodium 136 135 - 145 mmol/L     Potassium 3.6 3.6 - 5.5 mmol/L    Chloride 97 96 - 112 mmol/L    Co2 21 20 - 33 mmol/L    Anion Gap 18.0 (H) 7.0 - 16.0    Glucose 78 65 - 99 mg/dL    Bun 21 8 - 22 mg/dL    Creatinine 0.79 0.50 - 1.40 mg/dL    Calcium 9.6 8.5 - 10.5 mg/dL    AST(SGOT) 44 12 - 45 U/L    ALT(SGPT) 35 2 - 50 U/L    Alkaline Phosphatase 75 30 - 99 U/L    Total Bilirubin 1.6 (H) 0.1 - 1.5 mg/dL    Albumin 4.6 3.2 - 4.9 g/dL    Total Protein 8.0 6.0 - 8.2 g/dL    Globulin 3.4 1.9 - 3.5 g/dL    A-G Ratio 1.4 g/dL   DIAGNOSTIC ALCOHOL   Result Value Ref Range    Diagnostic Alcohol <10.1 <10.1 mg/dL   AMMONIA   Result Value Ref Range    Ammonia 15 11 - 45 umol/L   HCG QUAL SERUM   Result Value Ref Range    Beta-Hcg Qualitative Serum Negative Negative   CREATINE KINASE   Result Value Ref Range    CPK Total 429 (H) 0 - 154 U/L   CORRECTED CALCIUM   Result Value Ref Range    Correct Calcium 9.1 8.5 - 10.5 mg/dL   ESTIMATED GFR   Result Value Ref Range    GFR (CKD-EPI) 99 >60 mL/min/1.73 m 2       Radiology  CT-HEAD W/O   Final Result      Head CT without contrast within normal limits. No evidence of acute cerebral infarction, hemorrhage or mass lesion.             Medications:   New Prescriptions    No medications on file         Upon Reevaluation, the patient's condition has: not improved; and will be escalated to hospitalization.    Patient discharged from ED Observation status at 4:49 AM 5/16/2023     Total time spent on this ED Observation discharge encounter is < 30 Minutes    Electronically signed by: Yehuda Lim M.D., 5/16/2023 4:46 AM

## 2023-07-07 ENCOUNTER — HOSPITAL ENCOUNTER (EMERGENCY)
Facility: MEDICAL CENTER | Age: 38
End: 2023-07-07
Attending: EMERGENCY MEDICINE
Payer: COMMERCIAL

## 2023-07-07 VITALS
TEMPERATURE: 97.8 F | DIASTOLIC BLOOD PRESSURE: 61 MMHG | WEIGHT: 220.02 LBS | HEART RATE: 70 BPM | SYSTOLIC BLOOD PRESSURE: 121 MMHG | RESPIRATION RATE: 15 BRPM | HEIGHT: 64 IN | OXYGEN SATURATION: 94 % | BODY MASS INDEX: 37.56 KG/M2

## 2023-07-07 DIAGNOSIS — F23 ACUTE PSYCHOSIS (HCC): ICD-10-CM

## 2023-07-07 DIAGNOSIS — F15.10 METHAMPHETAMINE ABUSE (HCC): ICD-10-CM

## 2023-07-07 LAB
AMPHET UR QL SCN: POSITIVE
BARBITURATES UR QL SCN: NEGATIVE
BENZODIAZ UR QL SCN: NEGATIVE
BZE UR QL SCN: NEGATIVE
CANNABINOIDS UR QL SCN: POSITIVE
ETHANOL BLD-MCNC: <10.1 MG/DL
FENTANYL UR QL: NEGATIVE
FLUAV RNA SPEC QL NAA+PROBE: NEGATIVE
FLUBV RNA SPEC QL NAA+PROBE: NEGATIVE
HCG UR QL: NEGATIVE
HIV 1+2 AB+HIV1 P24 AG SERPL QL IA: NORMAL
METHADONE UR QL SCN: NEGATIVE
OPIATES UR QL SCN: NEGATIVE
OXYCODONE UR QL SCN: NEGATIVE
PCP UR QL SCN: NEGATIVE
PROPOXYPH UR QL SCN: NEGATIVE
RSV RNA SPEC QL NAA+PROBE: NEGATIVE
SARS-COV-2 RNA RESP QL NAA+PROBE: NOTDETECTED
SPECIMEN SOURCE: NORMAL
T PALLIDUM AB SER QL IA: NORMAL

## 2023-07-07 PROCEDURE — 80307 DRUG TEST PRSMV CHEM ANLYZR: CPT

## 2023-07-07 PROCEDURE — 87591 N.GONORRHOEAE DNA AMP PROB: CPT

## 2023-07-07 PROCEDURE — 700102 HCHG RX REV CODE 250 W/ 637 OVERRIDE(OP): Mod: UD | Performed by: EMERGENCY MEDICINE

## 2023-07-07 PROCEDURE — 87389 HIV-1 AG W/HIV-1&-2 AB AG IA: CPT

## 2023-07-07 PROCEDURE — A9270 NON-COVERED ITEM OR SERVICE: HCPCS | Mod: UD | Performed by: EMERGENCY MEDICINE

## 2023-07-07 PROCEDURE — 36415 COLL VENOUS BLD VENIPUNCTURE: CPT

## 2023-07-07 PROCEDURE — 90791 PSYCH DIAGNOSTIC EVALUATION: CPT

## 2023-07-07 PROCEDURE — 0241U HCHG SARS-COV-2 COVID-19 NFCT DS RESP RNA 4 TRGT MIC: CPT

## 2023-07-07 PROCEDURE — 81025 URINE PREGNANCY TEST: CPT

## 2023-07-07 PROCEDURE — C9803 HOPD COVID-19 SPEC COLLECT: HCPCS | Performed by: EMERGENCY MEDICINE

## 2023-07-07 PROCEDURE — 82077 ASSAY SPEC XCP UR&BREATH IA: CPT

## 2023-07-07 PROCEDURE — 99285 EMERGENCY DEPT VISIT HI MDM: CPT

## 2023-07-07 PROCEDURE — 86780 TREPONEMA PALLIDUM: CPT

## 2023-07-07 PROCEDURE — 87491 CHLMYD TRACH DNA AMP PROBE: CPT

## 2023-07-07 RX ORDER — BENZTROPINE MESYLATE 1 MG/1
1 TABLET ORAL ONCE
Status: COMPLETED | OUTPATIENT
Start: 2023-07-07 | End: 2023-07-07

## 2023-07-07 RX ORDER — HALOPERIDOL 5 MG/1
5 TABLET ORAL ONCE
Status: COMPLETED | OUTPATIENT
Start: 2023-07-07 | End: 2023-07-07

## 2023-07-07 RX ADMIN — HALOPERIDOL 5 MG: 5 TABLET ORAL at 14:57

## 2023-07-07 RX ADMIN — BENZTROPINE MESYLATE 1 MG: 1 TABLET ORAL at 14:58

## 2023-07-07 ASSESSMENT — FIBROSIS 4 INDEX: FIB4 SCORE: 0.64

## 2023-07-07 NOTE — DISCHARGE PLANNING
Alert Team/Behavioral Health   Note:        [facility] ALERT TEAM DISCHARGE PLANNING NOTE    Date:  7/7/23  Patient Name:  Yane Orellana - 38 y.o. - Discharge Planning  MRN:  [id number]   YOB: 1985  ADMISSION DATE:  7/7/2023     Writer forwarded referral packet for inpatient psychiatric care to the following community providers:  [facility]     Items included in the referral packet:   _x____Face Sheet   _x____Pages 1 and 2 of completed legal hold   _____Alert Team/Psych Assessment   _x____H&P   _x____UDS   _x____Blood Alcohol   _x____Vital signs   _x neg____Pregnancy Test (if applicable)   _x____Medications List   _____Covid Screen

## 2023-07-07 NOTE — ED NOTES
Patient sitting on bed rocking talking to self. Patient in direct view of nursing station for patient safety.
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
## 2023-07-07 NOTE — DISCHARGE PLANNING
Alert Team/Behavioral Health   Note:        [facility] is interested in accepting patient if Covid test is ordered and is negative.    Notified Attending Provider (Carlos A MOURA), Bedside RN (Myrna CANDELARIA), and Alert Team RN (Sandy NAILS) .
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
## 2023-07-07 NOTE — ED NOTES
Patient sitting up on bed rocking back and forth. Patient talking to wall and answering while laughing.

## 2023-07-07 NOTE — DISCHARGE PLANNING
Alert Team/Behavioral Health   Note:      - [facility]: Charge RN (Rachael) called. Transferred to green pod to get nurse report on behavior.

## 2023-07-07 NOTE — ED NOTES
Pt in gown, attached to monitor.  VSS on RA.  A+Ox3, person place time. Pt denies SI, making statements that do not make sense.  Legal hold paperwork received from EMS.

## 2023-07-07 NOTE — ED NOTES
Med Rec complete per patient   Allergies reviewed  No oral antibiotics in the last 30 days  Preferred pharmacy: [facility] on [address]    Patient states she was on an antibiotic however [facility] has no record of any antibiotics within the past month
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
## 2023-07-08 NOTE — DISCHARGE PLANNING
Alert Team:    Pt has been accepted by Dr. Tracey at [facility]; requesting transport as soon as possible. Transport has been arranged via [facility] for 2030. Transfer packet with original legal hold placed in chart. Updated RN and ERP.

## 2023-07-08 NOTE — ED NOTES
Pt transferred now to [facility] with EMS.Report given to EMS. Pts belongings pulled from locker and sent with pt. Pt GCS15, VSS. Pt cooperative at time of transport

## 2023-07-08 NOTE — DISCHARGE SUMMARY
"ED Observation Discharge Summary    Patient:Yane Orellana  Patient : 1985  Patient MRN: [id number]  Patient PCP: Oscar Hernandez M.D.    Admit Date: 2023  Discharge Date and Time: 23 8:29 PM  Discharge Diagnosis:   1. Acute psychosis (HCC)        2. Methamphetamine abuse (HCC)            Discharge Attending: Jony Strauss M.D.  Discharge Service: ED Observation    ED Course  Yane is a 38 y.o. female who was evaluated at [facility] for acute psychosis, likely related to methamphetamine.  The patient was seen evaluated by the alert team, we felt the patient cannot care for self therefore placed the patient on a legal hold, the patient was transferred to psychiatric facility.    Discharge Exam:  /61   Pulse 70   Temp 36.6 °C (97.8 °F) (Temporal)   Resp 15   Ht 1.626 m (5' 4.02\")   Wt 99.8 kg (220 lb 0.3 oz)   SpO2 94%   BMI 37.75 kg/m² .    Constitutional: Awake and alert. Nontoxic  HENT:  Grossly normal  Eyes: Grossly normal  Neck: Normal range of motion  Cardiovascular: Normal heart rate   Thorax & Lungs: No respiratory distress  Abdomen: Nontender  Skin:  No pathologic rash.   Extremities: Well perfused  Psychiatric: Psychosis    Labs  Results for orders placed or performed during the hospital encounter of 23   DIAGNOSTIC ALCOHOL   Result Value Ref Range    Diagnostic Alcohol <10.1 <10.1 mg/dL   URINE DRUG SCREEN   Result Value Ref Range    Amphetamines Urine Positive (A) Negative    Barbiturates Negative Negative    Benzodiazepines Negative Negative    Cocaine Metabolite Negative Negative    Fentanyl, Urine Negative Negative    Methadone Negative Negative    Opiates Negative Negative    Oxycodone Negative Negative    Phencyclidine -Pcp Negative Negative    Propoxyphene Negative Negative    Cannabinoid Metab Positive (A) Negative   BETA-HCG QUALITATIVE URINE   Result Value Ref Range    Beta-Hcg Urine Negative Negative   T.PALLIDUM AB LUIS M (SCREENING)   Result " Value Ref Range    Syphilis, Treponemal Qual Non-Reactive Non-Reactive   HIV AG/AB COMBO ASSAY SCREENING   Result Value Ref Range    HIV Ag/Ab Combo Assay Non-Reactive Non Reactive   Chlamydia/GC, PCR (Urine)   Result Value Ref Range    Source Urine    CoV-2, FLU A/B, and RSV by PCR (2-4 Hours CEPHEID) : Collect NP swab in VTM    Specimen: Respirate   Result Value Ref Range    Influenza virus A RNA Negative Negative    Influenza virus B, PCR Negative Negative    RSV, PCR Negative Negative    SARS-CoV-2 by PCR NotDetected     SARS-CoV-2 Source NP Swab        Radiology  No orders to display       Medications:   New Prescriptions    No medications on file       My final assessment includes   1. Acute psychosis (HCC)        2. Methamphetamine abuse (HCC)            Upon Reevaluation, the patient's condition has: Improved; and will be discharged.    Patient discharged from ED Observation status at 1000 (Time) 7/7/2023 (Date).     Total time spent on this ED Observation discharge encounter is < 30 Minutes    Electronically signed by: Jony Strauss M.D., 7/7/2023 8:29 PM

## 2023-07-08 NOTE — ED NOTES
Patient resting on stretcher in no acute distress. Equal chest rise and fall noted. Q15 monitoring remains. Room safety checklist in use. No needs at this time, will continue to monitor

## 2023-07-08 NOTE — DISCHARGE PLANNING
Adina Hearn Patient Age: 79 year old  MESSAGE:   Patient needs a work in appointment with Dr Christensen.   First available is -   Per patient needs to be seen for FOLLOW UP .   Please advise a work in time.     States had an appointment in March we cancelled & was never offered another appointment- can she do a telephone appointment?        WEIGHT AND HEIGHT:   Wt Readings from Last 1 Encounters:   10/09/19 75.3 kg (166 lb)     Ht Readings from Last 1 Encounters:   10/09/19 5' 4\" (1.626 m)     BMI Readings from Last 1 Encounters:   10/09/19 28.49 kg/m²       ALLERGIES: no known allergies.  Current Outpatient Medications   Medication   • telmisartan-hydrochlorothiazide (MICARDIS HCT) 80-12.5 MG per tablet   • Dabigatran Etexilate Mesylate (PRADAXA) 110 MG Cap   • alendronate (FOSAMAX) 70 MG tablet   • atorvastatin (LIPITOR) 20 MG tablet     No current facility-administered medications for this visit.      PHARMACY to use: please ask           Pharmacy preference(s) on file:   [id number] - [place], [place] - [address] AT [facility] [address]  [address]  [facility] [address]  Phone: [phone] Fax: [phone]      CALL BACK INFO: Ok to leave response (including medical information) with family member or on answering machine  ROUTING: Patient's physician/staff        PCP: Verify Pcp         INS: Payor: MEDICARE / Plan: PARTA AND B / Product Type: MEDICARE   PATIENT ADDRESS:  [address] [place] [address]   Patient accepted to [facility] by Dr. Tracey.   Transport at any time.

## 2023-12-15 ENCOUNTER — APPOINTMENT (OUTPATIENT)
Dept: URGENT CARE | Facility: PHYSICIAN GROUP | Age: 38
End: 2023-12-15
Payer: COMMERCIAL

## 2023-12-15 ENCOUNTER — OFFICE VISIT (OUTPATIENT)
Dept: URGENT CARE | Facility: PHYSICIAN GROUP | Age: 38
End: 2023-12-15
Payer: COMMERCIAL

## 2023-12-15 VITALS
OXYGEN SATURATION: 99 % | SYSTOLIC BLOOD PRESSURE: 116 MMHG | DIASTOLIC BLOOD PRESSURE: 68 MMHG | BODY MASS INDEX: 38.24 KG/M2 | RESPIRATION RATE: 18 BRPM | HEIGHT: 64 IN | WEIGHT: 224 LBS | TEMPERATURE: 98.2 F | HEART RATE: 65 BPM

## 2023-12-15 DIAGNOSIS — H65.193 OTHER ACUTE NONSUPPURATIVE OTITIS MEDIA OF BOTH EARS, RECURRENCE NOT SPECIFIED: ICD-10-CM

## 2023-12-15 PROCEDURE — 3074F SYST BP LT 130 MM HG: CPT | Performed by: NURSE PRACTITIONER

## 2023-12-15 PROCEDURE — 99203 OFFICE O/P NEW LOW 30 MIN: CPT | Performed by: NURSE PRACTITIONER

## 2023-12-15 PROCEDURE — 3078F DIAST BP <80 MM HG: CPT | Performed by: NURSE PRACTITIONER

## 2023-12-15 RX ORDER — AMOXICILLIN AND CLAVULANATE POTASSIUM 875; 125 MG/1; MG/1
1 TABLET, FILM COATED ORAL 2 TIMES DAILY
Qty: 14 TABLET | Refills: 0 | Status: SHIPPED | OUTPATIENT
Start: 2023-12-15 | End: 2023-12-22

## 2023-12-15 ASSESSMENT — ENCOUNTER SYMPTOMS
CHILLS: 0
WHEEZING: 0
WEAKNESS: 0
NECK PAIN: 0
NAUSEA: 0
SINUS PAIN: 1
SHORTNESS OF BREATH: 0
CONSTIPATION: 0
DIARRHEA: 0
HEADACHES: 0
ABDOMINAL PAIN: 0
SORE THROAT: 0
VOMITING: 0
EYE DISCHARGE: 0
FEVER: 0
DIZZINESS: 0
MYALGIAS: 0
EYE REDNESS: 0
COUGH: 0

## 2023-12-15 ASSESSMENT — FIBROSIS 4 INDEX: FIB4 SCORE: 0.67

## 2023-12-16 NOTE — PROGRESS NOTES
Subjective     Yane Orellana is a 38 y.o. female who presents with Otalgia (Ear infection X 3 days. States ear sharp pain started on right ear and now feeling it on left ear. Sinus pressure)            Otalgia   Pertinent negatives include no abdominal pain, coughing, diarrhea, ear discharge, headaches, neck pain, rash, sore throat or vomiting.    has been experiencing acute right ear pain x 1 week.  Experiencing nasal congestion and runny nose and sinus pressure.   is now experiencing left ear pain x 3 days.  Subjective fever.  Eating and drinking well.  Mother gave her over-the-counter eardrops for pain which has helped.    PMH:  has a past medical history of ASTHMA (2006), Chlamydia (05/2010), Fall (06/22/2010), Migraine, Other specified symptom associated with female genital organs, Psychiatric disorder (2008), Psychiatric problem, Recurrent UTI (2010), Schizoaffective disorder (HCC), Substance abuse (HCC), Urinary tract infection, and Vaginal yeast infections (2010).  MEDS:   Current Outpatient Medications:     amoxicillin-clavulanate (AUGMENTIN) 875-125 MG Tab, Take 1 Tablet by mouth 2 times a day for 7 days., Disp: 14 Tablet, Rfl: 0    Acetaminophen (TYLENOL EXTRA STRENGTH PO), Take 1 Tablet by mouth 1 time a day as needed (PAIN)., Disp: , Rfl:     multivitamin Tab, Take 1 Tablet by mouth every day., Disp: , Rfl:   ALLERGIES:   Allergies   Allergen Reactions    Geodon [Ziprasidone Hydrochloride] Anaphylaxis     SURGHX:   Past Surgical History:   Procedure Laterality Date    GYN SURGERY      abortions x2     SOCHX:  reports that she quit smoking about 5 years ago. Her smoking use included cigarettes. She has never used smokeless tobacco. She reports that she does not currently use alcohol. She reports that she does not currently use drugs after having used the following drugs: Marijuana.  FH: Family history was reviewed, no pertinent findings to report        Review of Systems  "  Constitutional:  Negative for chills, fever and malaise/fatigue.   HENT:  Positive for congestion, ear pain and sinus pain. Negative for ear discharge, sore throat and tinnitus.    Eyes:  Negative for discharge and redness.   Respiratory:  Negative for cough, shortness of breath and wheezing.    Gastrointestinal:  Negative for abdominal pain, constipation, diarrhea, nausea and vomiting.   Musculoskeletal:  Negative for myalgias and neck pain.   Skin:  Negative for itching and rash.   Neurological:  Negative for dizziness, weakness and headaches.   Endo/Heme/Allergies:  Negative for environmental allergies.   All other systems reviewed and are negative.             Objective     /68 (BP Location: Left arm, Patient Position: Sitting, BP Cuff Size: Adult)   Pulse 65   Temp 36.8 °C (98.2 °F) (Temporal)   Resp 18   Ht 1.626 m (5' 4\")   Wt 102 kg (224 lb)   SpO2 99%   BMI 38.45 kg/m²      Physical Exam  Vitals reviewed.   Constitutional:       General: She is awake. She is not in acute distress.     Appearance: Normal appearance. She is well-developed. She is not ill-appearing, toxic-appearing or diaphoretic.   HENT:      Head: Normocephalic.      Right Ear: Tenderness present. No drainage or swelling.      Left Ear: Tenderness present. No drainage or swelling.      Ears:      Comments: Right ear canal erythema with discomfort on otoscope exam of both ear canals.     Nose: Congestion and rhinorrhea present.      Mouth/Throat:      Lips: Pink.      Mouth: Mucous membranes are dry.      Pharynx: Oropharynx is clear. Uvula midline.   Eyes:      Conjunctiva/sclera: Conjunctivae normal.      Pupils: Pupils are equal, round, and reactive to light.   Cardiovascular:      Rate and Rhythm: Normal rate.   Pulmonary:      Effort: Pulmonary effort is normal.      Breath sounds: Normal breath sounds and air entry.   Musculoskeletal:         General: Normal range of motion.      Cervical back: Normal range of motion and " neck supple.   Skin:     General: Skin is warm and dry.   Neurological:      Mental Status: She is alert and oriented to person, place, and time.   Psychiatric:         Attention and Perception: Attention normal.         Mood and Affect: Mood normal.         Speech: Speech normal.         Behavior: Behavior normal. Behavior is cooperative.                             Assessment & Plan        1. Other acute nonsuppurative otitis media of both ears, recurrence not specified    - amoxicillin-clavulanate (AUGMENTIN) 875-125 MG Tab; Take 1 Tablet by mouth 2 times a day for 7 days.  Dispense: 14 Tablet; Refill: 0    -May use over the counter NSAID/Tylenol for any fever or ear/sinus pain  -May use over the counter longer acting allergy medication for any sinus congestion/post nasal drip related to ear pressure (chose one: Claritin/Zyrtec/Allegra without decongestant) x 1 week then as needed   -May use saline nasal spray for nasal congestion/post nasal drip as needed up to 4x/day   -May use over the counter nasal decongestant like Flonase/Nasacort as needed for sinus congestion related to ear pressures x 1 week then as needed   -Maintain hydration status   -Monitor for fevers, difficulty or abnormal hearing, pain in ears, headache, dizziness- need re-evaluation in urgent care

## 2024-01-20 ENCOUNTER — OFFICE VISIT (OUTPATIENT)
Dept: URGENT CARE | Facility: PHYSICIAN GROUP | Age: 39
End: 2024-01-20
Payer: COMMERCIAL

## 2024-01-20 ENCOUNTER — HOSPITAL ENCOUNTER (OUTPATIENT)
Dept: RADIOLOGY | Facility: MEDICAL CENTER | Age: 39
End: 2024-01-20
Payer: COMMERCIAL

## 2024-01-20 VITALS
TEMPERATURE: 98.1 F | HEART RATE: 97 BPM | SYSTOLIC BLOOD PRESSURE: 106 MMHG | HEIGHT: 64 IN | BODY MASS INDEX: 44.56 KG/M2 | WEIGHT: 261 LBS | DIASTOLIC BLOOD PRESSURE: 70 MMHG | OXYGEN SATURATION: 97 % | RESPIRATION RATE: 14 BRPM

## 2024-01-20 DIAGNOSIS — J01.90 ACUTE BACTERIAL RHINOSINUSITIS: ICD-10-CM

## 2024-01-20 DIAGNOSIS — R05.1 ACUTE COUGH: ICD-10-CM

## 2024-01-20 DIAGNOSIS — R06.02 SOB (SHORTNESS OF BREATH) ON EXERTION: ICD-10-CM

## 2024-01-20 DIAGNOSIS — J45.21 MILD INTERMITTENT ASTHMA WITH ACUTE EXACERBATION: ICD-10-CM

## 2024-01-20 DIAGNOSIS — B96.89 ACUTE BACTERIAL RHINOSINUSITIS: ICD-10-CM

## 2024-01-20 PROCEDURE — 3078F DIAST BP <80 MM HG: CPT

## 2024-01-20 PROCEDURE — 99214 OFFICE O/P EST MOD 30 MIN: CPT

## 2024-01-20 PROCEDURE — 71046 X-RAY EXAM CHEST 2 VIEWS: CPT

## 2024-01-20 PROCEDURE — 3074F SYST BP LT 130 MM HG: CPT

## 2024-01-20 RX ORDER — BENZONATATE 100 MG/1
100 CAPSULE ORAL 3 TIMES DAILY PRN
Qty: 60 CAPSULE | Refills: 0 | Status: SHIPPED | OUTPATIENT
Start: 2024-01-20

## 2024-01-20 RX ORDER — DOXYCYCLINE HYCLATE 100 MG
100 TABLET ORAL 2 TIMES DAILY
Qty: 20 TABLET | Refills: 0 | Status: SHIPPED | OUTPATIENT
Start: 2024-01-20 | End: 2024-01-30

## 2024-01-20 RX ORDER — ALBUTEROL SULFATE 90 UG/1
2 AEROSOL, METERED RESPIRATORY (INHALATION) EVERY 6 HOURS PRN
Qty: 8.5 G | Refills: 0 | Status: SHIPPED | OUTPATIENT
Start: 2024-01-20

## 2024-01-20 RX ORDER — METHYLPREDNISOLONE 4 MG/1
TABLET ORAL
Qty: 21 TABLET | Refills: 0 | Status: SHIPPED | OUTPATIENT
Start: 2024-01-20

## 2024-01-20 ASSESSMENT — ENCOUNTER SYMPTOMS
DIAPHORESIS: 0
HOARSE VOICE: 0
SWOLLEN GLANDS: 0
COUGH: 1
SINUS PRESSURE: 1
CHILLS: 0
HEADACHES: 1
SORE THROAT: 0

## 2024-01-20 ASSESSMENT — FIBROSIS 4 INDEX: FIB4 SCORE: 0.67
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
## 2024-05-22 ENCOUNTER — OFFICE VISIT (OUTPATIENT)
Dept: URGENT CARE | Facility: PHYSICIAN GROUP | Age: 39
End: 2024-05-22
Payer: COMMERCIAL

## 2024-05-22 VITALS
HEART RATE: 16 BPM | RESPIRATION RATE: 15 BRPM | SYSTOLIC BLOOD PRESSURE: 110 MMHG | DIASTOLIC BLOOD PRESSURE: 70 MMHG | TEMPERATURE: 97.6 F | HEIGHT: 64 IN | OXYGEN SATURATION: 96 % | WEIGHT: 282.19 LBS | BODY MASS INDEX: 48.18 KG/M2

## 2024-05-22 DIAGNOSIS — G43.009 MIGRAINE WITHOUT AURA AND WITHOUT STATUS MIGRAINOSUS, NOT INTRACTABLE: ICD-10-CM

## 2024-05-22 DIAGNOSIS — K21.9 GASTROESOPHAGEAL REFLUX DISEASE, UNSPECIFIED WHETHER ESOPHAGITIS PRESENT: ICD-10-CM

## 2024-05-22 DIAGNOSIS — R07.9 CHEST PAIN, UNSPECIFIED TYPE: ICD-10-CM

## 2024-05-22 LAB
APPEARANCE UR: CLEAR
BILIRUB UR STRIP-MCNC: NEGATIVE MG/DL
COLOR UR AUTO: NORMAL
GLUCOSE UR STRIP.AUTO-MCNC: NEGATIVE MG/DL
KETONES UR STRIP.AUTO-MCNC: NORMAL MG/DL
LEUKOCYTE ESTERASE UR QL STRIP.AUTO: NEGATIVE
NITRITE UR QL STRIP.AUTO: NEGATIVE
PH UR STRIP.AUTO: 7 [PH] (ref 5–8)
POCT INT CON NEG: NEGATIVE
POCT INT CON POS: POSITIVE
POCT URINE PREGNANCY TEST: NEGATIVE
PROT UR QL STRIP: NEGATIVE MG/DL
RBC UR QL AUTO: NEGATIVE
SP GR UR STRIP.AUTO: 1.02
UROBILINOGEN UR STRIP-MCNC: 0.2 MG/DL

## 2024-05-22 PROCEDURE — 81025 URINE PREGNANCY TEST: CPT | Performed by: STUDENT IN AN ORGANIZED HEALTH CARE EDUCATION/TRAINING PROGRAM

## 2024-05-22 PROCEDURE — 99215 OFFICE O/P EST HI 40 MIN: CPT | Performed by: STUDENT IN AN ORGANIZED HEALTH CARE EDUCATION/TRAINING PROGRAM

## 2024-05-22 PROCEDURE — 3074F SYST BP LT 130 MM HG: CPT | Performed by: STUDENT IN AN ORGANIZED HEALTH CARE EDUCATION/TRAINING PROGRAM

## 2024-05-22 PROCEDURE — 81002 URINALYSIS NONAUTO W/O SCOPE: CPT | Performed by: STUDENT IN AN ORGANIZED HEALTH CARE EDUCATION/TRAINING PROGRAM

## 2024-05-22 PROCEDURE — 3078F DIAST BP <80 MM HG: CPT | Performed by: STUDENT IN AN ORGANIZED HEALTH CARE EDUCATION/TRAINING PROGRAM

## 2024-05-22 RX ORDER — BUPROPION HYDROCHLORIDE 150 MG/1
150 TABLET ORAL EVERY MORNING
COMMUNITY
Start: 2024-04-08

## 2024-05-22 RX ORDER — PROPRANOLOL HYDROCHLORIDE 20 MG/1
20 TABLET ORAL 2 TIMES DAILY PRN
COMMUNITY
Start: 2024-04-08

## 2024-05-22 RX ORDER — GABAPENTIN 100 MG/1
200 CAPSULE ORAL 2 TIMES DAILY
COMMUNITY
Start: 2024-04-08

## 2024-05-22 RX ORDER — PALIPERIDONE PALMITATE 234 MG/1.5ML
INJECTION INTRAMUSCULAR
COMMUNITY
Start: 2024-05-09

## 2024-05-22 RX ORDER — FLUTICASONE PROPIONATE 50 MCG
2 SPRAY, SUSPENSION (ML) NASAL DAILY
COMMUNITY
Start: 2024-05-08

## 2024-05-22 RX ORDER — SUMATRIPTAN 50 MG/1
TABLET, FILM COATED ORAL
Qty: 9 TABLET | Refills: 0 | Status: SHIPPED | OUTPATIENT
Start: 2024-05-22

## 2024-05-22 RX ORDER — OMEPRAZOLE 40 MG/1
40 CAPSULE, DELAYED RELEASE ORAL DAILY
Qty: 30 CAPSULE | Refills: 1 | Status: SHIPPED | OUTPATIENT
Start: 2024-05-22

## 2024-05-22 RX ORDER — DIVALPROEX SODIUM 125 MG/1
375 CAPSULE, COATED PELLETS ORAL 2 TIMES DAILY
COMMUNITY
Start: 2024-04-08

## 2024-05-22 ASSESSMENT — FIBROSIS 4 INDEX: FIB4 SCORE: 0.67
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
## 2024-09-13 ENCOUNTER — OFFICE VISIT (OUTPATIENT)
Dept: URGENT CARE | Facility: PHYSICIAN GROUP | Age: 39
End: 2024-09-13
Payer: COMMERCIAL

## 2024-09-13 ENCOUNTER — HOSPITAL ENCOUNTER (OUTPATIENT)
Dept: RADIOLOGY | Facility: MEDICAL CENTER | Age: 39
End: 2024-09-13
Attending: PHYSICIAN ASSISTANT
Payer: OTHER MISCELLANEOUS

## 2024-09-13 VITALS
RESPIRATION RATE: 18 BRPM | SYSTOLIC BLOOD PRESSURE: 128 MMHG | HEIGHT: 64 IN | TEMPERATURE: 98 F | OXYGEN SATURATION: 98 % | WEIGHT: 282.19 LBS | BODY MASS INDEX: 48.18 KG/M2 | HEART RATE: 94 BPM | DIASTOLIC BLOOD PRESSURE: 84 MMHG

## 2024-09-13 DIAGNOSIS — S99.922A FOOT INJURY, LEFT, INITIAL ENCOUNTER: ICD-10-CM

## 2024-09-13 DIAGNOSIS — E66.1 DRUG-INDUCED OBESITY, UNSPECIFIED CLASSIFICATION, UNSPECIFIED WHETHER SERIOUS COMORBIDITY PRESENT: ICD-10-CM

## 2024-09-13 DIAGNOSIS — S91.319A CUT OF FOOT: ICD-10-CM

## 2024-09-13 DIAGNOSIS — S96.912A STRAIN OF LEFT FOOT, INITIAL ENCOUNTER: ICD-10-CM

## 2024-09-13 PROCEDURE — 99214 OFFICE O/P EST MOD 30 MIN: CPT | Performed by: PHYSICIAN ASSISTANT

## 2024-09-13 PROCEDURE — 73630 X-RAY EXAM OF FOOT: CPT | Mod: LT

## 2024-09-13 PROCEDURE — 3074F SYST BP LT 130 MM HG: CPT | Performed by: PHYSICIAN ASSISTANT

## 2024-09-13 PROCEDURE — 3079F DIAST BP 80-89 MM HG: CPT | Performed by: PHYSICIAN ASSISTANT

## 2024-09-13 ASSESSMENT — ENCOUNTER SYMPTOMS
FEVER: 0
CHILLS: 0

## 2024-09-13 ASSESSMENT — FIBROSIS 4 INDEX: FIB4 SCORE: 0.69
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
## 2024-11-26 ENCOUNTER — HOSPITAL ENCOUNTER (EMERGENCY)
Facility: MEDICAL CENTER | Age: 39
End: 2024-11-26
Attending: STUDENT IN AN ORGANIZED HEALTH CARE EDUCATION/TRAINING PROGRAM
Payer: COMMERCIAL

## 2024-11-26 ENCOUNTER — APPOINTMENT (OUTPATIENT)
Dept: RADIOLOGY | Facility: MEDICAL CENTER | Age: 39
End: 2024-11-26
Attending: STUDENT IN AN ORGANIZED HEALTH CARE EDUCATION/TRAINING PROGRAM
Payer: COMMERCIAL

## 2024-11-26 VITALS
HEART RATE: 80 BPM | TEMPERATURE: 98 F | WEIGHT: 282 LBS | HEIGHT: 64 IN | OXYGEN SATURATION: 98 % | SYSTOLIC BLOOD PRESSURE: 141 MMHG | DIASTOLIC BLOOD PRESSURE: 98 MMHG | RESPIRATION RATE: 16 BRPM | BODY MASS INDEX: 48.14 KG/M2

## 2024-11-26 DIAGNOSIS — S29.019A THORACIC MYOFASCIAL STRAIN, INITIAL ENCOUNTER: ICD-10-CM

## 2024-11-26 DIAGNOSIS — M25.511 ACUTE PAIN OF RIGHT SHOULDER: ICD-10-CM

## 2024-11-26 DIAGNOSIS — I10 PRIMARY HYPERTENSION: ICD-10-CM

## 2024-11-26 PROCEDURE — A9270 NON-COVERED ITEM OR SERVICE: HCPCS | Mod: UD | Performed by: STUDENT IN AN ORGANIZED HEALTH CARE EDUCATION/TRAINING PROGRAM

## 2024-11-26 PROCEDURE — 700101 HCHG RX REV CODE 250: Mod: UD | Performed by: STUDENT IN AN ORGANIZED HEALTH CARE EDUCATION/TRAINING PROGRAM

## 2024-11-26 PROCEDURE — 700111 HCHG RX REV CODE 636 W/ 250 OVERRIDE (IP): Mod: JZ,UD | Performed by: STUDENT IN AN ORGANIZED HEALTH CARE EDUCATION/TRAINING PROGRAM

## 2024-11-26 PROCEDURE — 96372 THER/PROPH/DIAG INJ SC/IM: CPT

## 2024-11-26 PROCEDURE — 700102 HCHG RX REV CODE 250 W/ 637 OVERRIDE(OP): Mod: UD | Performed by: STUDENT IN AN ORGANIZED HEALTH CARE EDUCATION/TRAINING PROGRAM

## 2024-11-26 PROCEDURE — 99284 EMERGENCY DEPT VISIT MOD MDM: CPT

## 2024-11-26 PROCEDURE — 73030 X-RAY EXAM OF SHOULDER: CPT | Mod: RT

## 2024-11-26 RX ORDER — CYCLOBENZAPRINE HCL 10 MG
10 TABLET ORAL 3 TIMES DAILY PRN
Qty: 30 TABLET | Refills: 0 | Status: SHIPPED | OUTPATIENT
Start: 2024-11-26

## 2024-11-26 RX ORDER — LIDOCAINE HCL 4% 4 G/100G
1 CREAM TOPICAL 3 TIMES DAILY
Qty: 80 ML | Refills: 0 | Status: SHIPPED | OUTPATIENT
Start: 2024-11-26

## 2024-11-26 RX ORDER — CYCLOBENZAPRINE HCL 10 MG
10 TABLET ORAL ONCE
Status: COMPLETED | OUTPATIENT
Start: 2024-11-26 | End: 2024-11-26

## 2024-11-26 RX ORDER — KETOROLAC TROMETHAMINE 15 MG/ML
15 INJECTION, SOLUTION INTRAMUSCULAR; INTRAVENOUS ONCE
Status: COMPLETED | OUTPATIENT
Start: 2024-11-26 | End: 2024-11-26

## 2024-11-26 RX ORDER — LIDOCAINE 4 G/G
1 PATCH TOPICAL EVERY 24 HOURS
Status: DISCONTINUED | OUTPATIENT
Start: 2024-11-26 | End: 2024-11-26 | Stop reason: HOSPADM

## 2024-11-26 RX ADMIN — KETOROLAC TROMETHAMINE 15 MG: 15 INJECTION, SOLUTION INTRAMUSCULAR; INTRAVENOUS at 12:26

## 2024-11-26 RX ADMIN — LIDOCAINE 1 PATCH: 4 PATCH TOPICAL at 12:26

## 2024-11-26 RX ADMIN — CYCLOBENZAPRINE 10 MG: 10 TABLET, FILM COATED ORAL at 12:26

## 2024-11-26 ASSESSMENT — PAIN DESCRIPTION - PAIN TYPE: TYPE: ACUTE PAIN

## 2024-11-26 ASSESSMENT — FIBROSIS 4 INDEX: FIB4 SCORE: 0.69

## 2024-11-26 NOTE — ED PROVIDER NOTES
ED Provider Note    CHIEF COMPLAINT  Chief Complaint   Patient presents with    Shoulder Pain     C/o right shoulder pain after putting up metal sheets yesterday and it fell while trying to hold it. CMS intact.        EXTERNAL RECORDS REVIEWED  Outpatient Notes outpatient visits  for strain of left foot obesity and cut to the foot.    HPI/ROS  LIMITATION TO HISTORY   Select: : None  OUTSIDE HISTORIAN(S):  None    Yane Orellana is a 39 y.o. female who presents with complaint of right shoulder pain.  Patient was putting a metal sheet yesterday and one of the sheets came back at her.  She notes the pain is more posterior in her scapular region. Not taking medications for her pain.  Notes prior shoulder injuries in the past.  Does report some fourth and fifth digit paresthesias of the right hand.  She is not dropping things, notes no weakness in the arm.    PAST MEDICAL HISTORY   has a past medical history of ASTHMA (), Chlamydia (2010), Fall (2010), Migraine, Other specified symptom associated with female genital organs, Psychiatric disorder (), Psychiatric problem, Recurrent UTI (), Schizoaffective disorder (), Substance abuse (), Urinary tract infection, and Vaginal yeast infections ().    SURGICAL HISTORY   has a past surgical history that includes gyn surgery.    FAMILY HISTORY  Family History   Problem Relation Age of Onset    Diabetes Mother     Psychiatric Illness Mother     Diabetes Maternal Grandmother     Diabetes Maternal Grandfather        SOCIAL HISTORY  Social History     Tobacco Use    Smoking status: Former     Current packs/day: 0.00     Types: Cigarettes     Quit date: 2018     Years since quittin.1    Smokeless tobacco: Never   Vaping Use    Vaping status: Some Days    Substances: Nicotine   Substance and Sexual Activity    Alcohol use: Not Currently     Comment: Occasionally    Drug use: Not Currently     Types: Marijuana     Comment: medical card   "   Sexual activity: Yes       CURRENT MEDICATIONS  Home Medications       Reviewed by Mariia Huerta R.N. (Registered Nurse) on 11/26/24 at 1057  Med List Status: Partial     Medication Last Dose Status   Acetaminophen (TYLENOL EXTRA STRENGTH PO)  Active   albuterol 108 (90 Base) MCG/ACT Aero Soln inhalation aerosol  Active   benzonatate (TESSALON) 100 MG Cap  Active   buPROPion (WELLBUTRIN XL) 150 MG XL tablet  Active   divalproex (DEPAKOTE SPRINKLE) 125 MG Capsule Delayed Release Sprinkle  Active   fluticasone (FLONASE) 50 MCG/ACT nasal spray  Active   gabapentin (NEURONTIN) 100 MG Cap  Active   INVEGA SUSTENNA 234 MG/1.5ML Suspension Prefilled Syringe  Active   methylPREDNISolone (MEDROL DOSEPAK) 4 MG Tablet Therapy Pack  Active   multivitamin Tab  Active   omeprazole (PRILOSEC) 40 MG delayed-release capsule  Active   propranolol (INDERAL) 20 MG Tab  Active   SUMAtriptan (IMITREX) 50 MG Tab  Active                    ALLERGIES  Allergies   Allergen Reactions    Geodon [Ziprasidone Hydrochloride] Anaphylaxis       PHYSICAL EXAM  VITAL SIGNS: BP (!) 141/98   Pulse 80   Temp 36.7 °C (98 °F) (Temporal)   Resp 16   Ht 1.626 m (5' 4\")   Wt (!) 128 kg (282 lb)   SpO2 98%   BMI 48.41 kg/m²    Pulse oximetry interpretation: I interpret the pulse oximetry as normal.  Constitutional: Awake and alert. No acute distress.  Head: NCAT.  HEENT: Normal Conjunctiva.  Neck: Grossly normal range of motion. Airway midline.  Cardiovascular: Radial pulse 2+ bilaterally  Thorax & Lungs: No respiratory distress.   Skin: No obvious rash.  Back: No midline tenderness, right mid thoracic paraspinal tenderness adjacent to the scapula  Musculoskeletal: No obvious deformity. Moves all extremities Well.  Empty can test negative.   strength is 5 out of 5 bilaterally.  Pain with active range of motion above shoulder height with mom.  Neurologic: A&Ox4.  Sensation grossly intact to exam.  Full motor intact at the elbow and " hand.  Psychiatric: Mood and affect are appropriate for situation.    RADIOLOGY/PROCEDURES   I have independently interpreted the diagnostic imaging associated with this visit and am waiting the final reading from the radiologist.   My preliminary interpretation is as follows: no fracture or dislocation of humerus    Radiologist interpretation:  DX-SHOULDER 2+ RIGHT   Final Result      No radiographic evidence of acute traumatic injury.          COURSE & MEDICAL DECISION MAKING    ASSESSMENT, COURSE AND PLAN  Care Narrative:   39-year-old female reports prior shoulder injuries in the past but no surgery here for aggravated shoulder pain and right thoracic back pain after some metal sheeting shifted while she was hanging over her head  Afebrile hypertensive  On exam no deformity, CMS intact.  Negative empty can.  Tenderness over the right thoracic paraspinal muscles and shoulder.  Considerations to sprain, strain, dislocation, rotator cuff injury, neck injury  She is medicated for pain  X-ray shows no fracture  Discussed myofascial strain versus cervical radiculopathy.  Advised muscle relaxants and anti-inflammatories for period of 1 to 2 weeks.  Advise follow-up with primary care doctor if symptoms are lingering for further evaluation of neck.    Z01.30: Encounter for examination of blood pressure without abnormal findings:   I spent a total of 5 minutes counseling patient on blood pressure control and management.  We discussed medication management with PCP.  I recommended keeping a blood pressure journal, taking the blood pressure once in the morning once evening after resting for 5 minutes. Advise follow up with PCP to discuss medication management or adjustment.  Discussed hypertensive emergencies and what signs and symptoms to be monitoring for and to return to the ED for.  Patient verbalized understanding and was receptive of counseling.      ADDITIONAL PROBLEMS MANAGED    Right shoulder pain  Right thoracic  tenderness  Hypertension    DISPOSITION AND DISCUSSIONS  I have discussed management of the patient with the following physicians and JONO's:  none    Discussion of management with other Q or appropriate source(s): None     Escalation of care considered, and ultimately not performed:diagnostic imaging and acute inpatient care management, however at this time, the patient is most appropriate for outpatient management    Barriers to care at this time, including but not limited to:  None .     Decision tools and prescription drugs considered including, but not limited to:  None .    FINAL DIAGNOSIS  1. Acute pain of right shoulder Acute   2. Thoracic myofascial strain, initial encounter Acute   3. Primary hypertension Chronic        Electronically signed by: Miladis Ordoñez D.O., 11/26/2024 11:41 AM

## 2024-11-26 NOTE — DISCHARGE INSTRUCTIONS
Please take Tylenol and ibuprofen for your pain.  You can continue Biofreeze and use ice as well.  Please use the muscle relaxants I have prescribed to help with your pain.  If still having symptoms and 7 to 14 days please follow-up with your primary care doctor for reevaluation.

## 2024-11-26 NOTE — ED TRIAGE NOTES
"Chief Complaint   Patient presents with    Shoulder Pain     C/o right shoulder pain after putting up metal sheets yesterday and it fell while trying to hold it. CMS intact.        Pt ambulatory to triage. Pt A&Ox4, room air.     Pt to lobby . Pt educated on alerting staff in changes to condition. Pt verbalized understanding.     BP (!) 147/124   Pulse 85   Temp 36.5 °C (97.7 °F) (Temporal)   Resp 20   Ht 1.626 m (5' 4\")   Wt (!) 128 kg (282 lb)   SpO2 100%   BMI 48.41 kg/m²     "

## 2024-11-26 NOTE — ED NOTES
Pt discharged to home. Pt was given follow up instructions and prescriptions for flexeril and lidocaine. Pt verbalized understanding of all instructions for discharge and is ambulatory out of ED with steady gait. AOx4

## 2024-12-08 ENCOUNTER — HOSPITAL ENCOUNTER (EMERGENCY)
Facility: MEDICAL CENTER | Age: 39
End: 2024-12-08
Attending: EMERGENCY MEDICINE
Payer: COMMERCIAL

## 2024-12-08 VITALS
DIASTOLIC BLOOD PRESSURE: 75 MMHG | WEIGHT: 281.53 LBS | BODY MASS INDEX: 48.06 KG/M2 | HEART RATE: 95 BPM | RESPIRATION RATE: 15 BRPM | HEIGHT: 64 IN | SYSTOLIC BLOOD PRESSURE: 135 MMHG | OXYGEN SATURATION: 96 % | TEMPERATURE: 98 F

## 2024-12-08 DIAGNOSIS — R20.2 HAND PARESTHESIA: ICD-10-CM

## 2024-12-08 DIAGNOSIS — G56.00 MEDIAN NERVE COMPRESSION: ICD-10-CM

## 2024-12-08 PROCEDURE — 99282 EMERGENCY DEPT VISIT SF MDM: CPT

## 2024-12-08 RX ORDER — METHYLPREDNISOLONE 4 MG/1
TABLET ORAL
Qty: 21 TABLET | Refills: 0 | Status: SHIPPED | OUTPATIENT
Start: 2024-12-08

## 2024-12-08 ASSESSMENT — FIBROSIS 4 INDEX: FIB4 SCORE: 0.69

## 2024-12-08 ASSESSMENT — PAIN DESCRIPTION - PAIN TYPE: TYPE: ACUTE PAIN

## 2024-12-08 NOTE — ED TRIAGE NOTES
".  Chief Complaint   Patient presents with    Hand Pain     Pt had right shoulder injury 10 days ago. Pt now reports right hand numbness.   CMS intact  Pt has pain to right posterior forearm 10/10    .BP (!) 138/100   Pulse 96   Temp 36.6 °C (97.9 °F) (Temporal)   Resp 16   Ht 1.626 m (5' 4\")   Wt (!) 128 kg (281 lb 8.4 oz)   LMP 11/29/2024 (Approximate)   SpO2 97%   BMI 48.32 kg/m²     Patient ambulatory to triage for above complaint. Patient aware to notify RN of any changes in symptoms. Patient educated on triage process. A&O x 4 and speaking in full sentences      "

## 2024-12-08 NOTE — ED PROVIDER NOTES
"  ER Provider Note    Scribed for Javier Jorge M.D. by Yaya Spivey. 2024   12:52 PM    Primary Care Provider: Chelsy Robison M.D.    CHIEF COMPLAINT  Chief Complaint   Patient presents with    Hand Pain     Pt had right shoulder injury 10 days ago. Pt now reports right hand numbness.     EXTERNAL RECORDS REVIEWED      HPI/ROS  LIMITATION TO HISTORY   None noted   OUTSIDE HISTORIAN(S):  Family present at bedside.     Yane Orellnaa is a 39 y.o. female who presents to the ED for evaluation right hand numbness secondary to an injury sustained 10 days ago. 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 Patient notes her thumb, pointer and ring finger remain largely numb since then with a shooting pain up to her elbow. Patient notes no numbness in her ring finger.     PAST MEDICAL HISTORY  Past Medical History:   Diagnosis Date    ASTHMA 2006    Chlamydia 2010    Fall 2010    1 week ago fell off a horse    Migraine     Other specified symptom associated with female genital organs      ab2    Psychiatric disorder 2008    Borderline personality disorder    Psychiatric problem     Bipolar and schizophrenia in family    Recurrent UTI 2010    Schizoaffective disorder (HCC)     Substance abuse (HCC)     Urinary tract infection     Recurrent UTI's 6-9 per year    Vaginal yeast infections 2010       SURGICAL HISTORY  Past Surgical History:   Procedure Laterality Date    GYN SURGERY      abortions x2       FAMILY HISTORY  Family History   Problem Relation Age of Onset    Diabetes Mother     Psychiatric Illness Mother     Diabetes Maternal Grandmother     Diabetes Maternal Grandfather        SOCIAL HISTORY   reports that she quit smoking about 6 years ago. Her smoking use included cigarettes. She has never used smokeless tobacco. She reports that she does not currently use alcohol. She reports that she does not currently use drugs after having used the " following drugs: Marijuana.    CURRENT MEDICATIONS  Discharge Medication List as of 12/8/2024 12:59 PM        CONTINUE these medications which have NOT CHANGED    Details   cyclobenzaprine (FLEXERIL) 10 mg Tab Take 1 Tablet by mouth 3 times a day as needed for Moderate Pain., Disp-30 Tablet, R-0, Normal      Lidocaine HCl 4 % Cream Apply 1 Application topically in the morning, at noon, and at bedtime., Disp-80 mL, R-0, Normal      buPROPion (WELLBUTRIN XL) 150 MG XL tablet Take 150 mg by mouth every morning., Historical Med      divalproex (DEPAKOTE SPRINKLE) 125 MG Capsule Delayed Release Sprinkle Take 375 mg by mouth 2 times a day., Historical Med      fluticasone (FLONASE) 50 MCG/ACT nasal spray 2 Sprays every day., Historical Med      gabapentin (NEURONTIN) 100 MG Cap Take 200 mg by mouth 2 times a day., Historical Med      INVEGA SUSTENNA 234 MG/1.5ML Suspension Prefilled Syringe KIRK, Historical Med      propranolol (INDERAL) 20 MG Tab Take 20 mg by mouth 2 times a day as needed., Historical Med      SUMAtriptan (IMITREX) 50 MG Tab Take 1 tab at initial onset of symptoms.  Take 2nd tab after 2 hrs if symptoms have not improved. Maximum dose: 100 mg per dose; 200 mg per 24 hours., Disp-9 Tablet, R-0, Normal      omeprazole (PRILOSEC) 40 MG delayed-release capsule Take 1 Capsule by mouth every day., Disp-30 Capsule, R-1, Normal      methylPREDNISolone (MEDROL DOSEPAK) 4 MG Tablet Therapy Pack Follow schedule on package instructions., Disp-21 Tablet, R-0, Normal      albuterol 108 (90 Base) MCG/ACT Aero Soln inhalation aerosol Inhale 2 Puffs every 6 hours as needed for Shortness of Breath., Disp-8.5 g, R-0, Normal      benzonatate (TESSALON) 100 MG Cap Take 1 Capsule by mouth 3 times a day as needed for Cough., Disp-60 Capsule, R-0, Normal      Acetaminophen (TYLENOL EXTRA STRENGTH PO) Take 1 Tablet by mouth 1 time a day as needed (PAIN)., Historical Med      multivitamin Tab Take 1 Tablet by mouth every day.,  "Historical Med             ALLERGIES  Allergies   Allergen Reactions    Geodon [Ziprasidone Hydrochloride] Anaphylaxis        PHYSICAL EXAM  BP (!) 138/100   Pulse 96   Temp 36.6 °C (97.9 °F) (Temporal)   Resp 16   Ht 1.626 m (5' 4\")   Wt (!) 128 kg (281 lb 8.4 oz)   LMP 11/29/2024 (Approximate)   SpO2 97%   BMI 48.32 kg/m²      Nursing note and vitals reviewed.  Constitutional: Well-developed and well-nourished. No distress.   HENT: Head is normocephalic and atraumatic. Oropharynx is clear and moist without exudate or erythema.   Eyes: Pupils are equal, round. Conjunctiva are normal.   Cardiovascular: Strong radial pulse. Capillary refill is less than 2 seconds distal to the point of injury.  Pulmonary/Chest: No respiratory distress. Chest wall is atraumatic.   Abdominal: Soft and non-tender. No distention. Atraumatic.    Musculoskeletal: Extremities exhibit normal range of motion. Mildly decreased sensation to thumb, index finger, and long finger of right hand, normal strength. Bilateral lower extremities are atraumatic.  Neurological: Awake, alert and oriented to person, place, and time. No focal deficits noted. Strength and sensation are normal distal to the point of injury.  Skin: Skin is warm and dry. No rash.   Psychiatric: Appropriate for clinical situation.     DIAGNOSTIC STUDIES    INITIAL ASSESSMENT AND PLAN    12:52 PM - Patient was evaluated at bedside. Informed patient this numbness pattern matches a median nerve injury. Will prescribe a steroid medication to alleviate the nerve pain.I addressed all patient questions.  The patient will be medicated as ordered for her symptoms. I discussed plan for discharge and follow up as outlined below. The patient is stable for discharge at this time and will return for any new or worsening symptoms. Patient verbalizes understanding and support with my plan for discharge.      DISPOSITION AND DISCUSSIONS    I have discussed management of the patient with " the following physicians and JONO's:  None noted     Discussion of management with other [facility] or appropriate source(s): None     The patient will return for new or worsening symptoms and is stable at the time of discharge.    DISPOSITION:  Patient will be discharged home in stable condition.    FOLLOW UP:  Chelsy Robison M.D.  [address]  [address]  [place] [address]  [phone]    Schedule an appointment as soon as possible for a visit       [facility], Emergency Dept  [address] [address] [phone]    If symptoms worsen      OUTPATIENT MEDICATIONS:  Discharge Medication List as of 12/8/2024 12:59 PM        START taking these medications    Details   methylPREDNISolone (MEDROL) 4 MG Tab Take as per the package instructions., Disp-21 Tablet, R-0, Normal              FINAL DIAGNOSIS  1. Hand paresthesia    2. Median nerve compression         Yaya REDMAN (Jaycee), am scribing for, and in the presence of, Javier Jorge M.D..    Electronically signed by: Yaya Spivey (Jaycee), 12/8/2024    Javier REDMAN M.D. personally performed the services described in this documentation, as scribed by Yaya Spivey in my presence, and it is both accurate and complete.      The note accurately reflects work and decisions made by me.  Javier Jorge M.D.  12/8/2024  7:31 PM

## 2025-01-03 NOTE — ED PROVIDER NOTES
ED Provider Note    Scribed for Yane Zepeda M.D. by John Rangel. 2023, 10:23 AM.    Primary care provider: Oscar Hernandez M.D.  Means of arrival: Walk-in  History obtained from: Patient  History limited by: None    CHIEF COMPLAINT  Chief Complaint   Patient presents with    Cough    Congestion     Reports chronic Bronchitis.        HPI/ROS  Yane Orellana is a 37 y.o. female who presents to the Emergency Department for evaluation of a cough onset 25 years ago. She has associated shortness of breath and congestion. She notes a history of chronic bronchitis and feels as if she has been more short of breath lately.  She reports associated dry cough.  She used albuterol in the past, but is currently out of these medications.  She denies any fevers. No alleviating or exacerbating factors noted.    EXTERNAL RECORDS REVIEWED  None    LIMITATION TO HISTORY   Select: : None    OUTSIDE HISTORIAN(S):  None      PAST MEDICAL HISTORY   has a past medical history of ASTHMA (), Chlamydia (2010), Fall (2010), Migraine, Other specified symptom associated with female genital organs, Psychiatric disorder (), Psychiatric problem, Recurrent UTI (), Urinary tract infection, and Vaginal yeast infections ().    SURGICAL HISTORY   has a past surgical history that includes gyn surgery.    SOCIAL HISTORY  Social History     Tobacco Use    Smoking status: Former     Types: Cigarettes     Quit date: 2018     Years since quittin.4    Smokeless tobacco: Never    Tobacco comments:     Vape 6 mg    Substance Use Topics    Alcohol use: Yes     Comment: Occasionally    Drug use: Yes     Types: Marijuana     Comment: medical card       Social History     Substance and Sexual Activity   Drug Use Yes    Types: Marijuana    Comment: medical card        FAMILY HISTORY  Family History   Problem Relation Age of Onset    Diabetes Mother     Psychiatric Illness Mother     Diabetes Maternal Grandmother      Subjective   Patient ID: Tiffanie Moreno is a 62 y.o. female. They present today with a chief complaint of Sinus Problem (Pt states x2weeks nasal congestion, pressure under eyes, productive cough with  green mucus and headache. Says ear pressure).    History of Present Illness  61 yo female coming in for sinus pressure, cough, nasal congestion, and ear pressure. She states this has been going on for the last 2 weeks. She denies any fevers or chills. She denies any other complaints.     Past Medical History  Allergies as of 01/03/2025 - Reviewed 01/03/2025   Allergen Reaction Noted    Augmentin [amoxicillin-pot clavulanate] Unknown 09/20/2023       (Not in a hospital admission)       No past medical history on file.    No past surgical history on file.     reports that she quit smoking about 9 years ago. Her smoking use included cigarettes. She has never used smokeless tobacco. She reports current alcohol use. She reports that she does not use drugs.    Review of Systems  Review of Systems:  General: No weight loss, fatigue, anorexia, insomnia, fever, chills.  ENT: No pharyngitis, dry mouth, positive nasal congestion, sinus pressure, ear pressure  Cardiac: No chest pain, palpitations, syncope, near syncope.  Pulmonary:  No shortness of breath, positive cough,no hemoptysis  Heme/lymph: No swollen glands, fever, bleeding  Musculoskeletal: No limb pain, joint pain, joint swelling.  Skin: No rashes  Neuro: No numbness, tingling, positive headaches                                 Objective    Vitals:    01/03/25 1531   BP: 129/78   Pulse: 94   Temp: 37.1 °C (98.8 °F)   SpO2: 96%     No LMP recorded. Patient is postmenopausal.    Physical Exam  Physical Exam:  General: Vital noted, no distress. Afebrile  EENT: Eyes unremarkable, Pupils PERRLA, EOMs intact. TMs unremarkable. Posterior oropharynx unremarkable. Uvula in the midline and non-edematous. No PTA. No retropharyngeal mass. No Bryn's angina.  Cardiac: Regular rate  "Diabetes Maternal Grandfather        CURRENT MEDICATIONS  Home Medications       Reviewed by Belén Manjarrez R.N. (Registered Nurse) on 02/18/23 at 0905  Med List Status: Partial     Medication Last Dose Status   albuterol (VENTOLIN HFA) 108 (90 Base) MCG/ACT Aero Soln inhalation aerosol  Active   ALPRAZolam (XANAX) 0.5 MG Tab  Active   amphetamine-dextroamphetamine (ADDERALL) 10 MG Tab  Active   buPROPion (WELLBUTRIN XL) 150 MG XL tablet  Active   cefdinir (OMNICEF) 300 MG Cap  Active   erythromycin 5 MG/GM Ointment  Active   gabapentin (NEURONTIN) 300 MG Cap  Active   naproxen (NAPROSYN) 500 MG Tab  Active   NON SPECIFIED  Active   NON SPECIFIED  Active   ondansetron (ZOFRAN ODT) 4 MG TABLET DISPERSIBLE  Active   phenazopyridine (PYRIDIUM) 200 MG Tab  Active   propranolol LA (INDERAL LA) 60 MG CAPSULE SR 24 HR  Active                    ALLERGIES  Allergies   Allergen Reactions    Geodon [Ziprasidone Hydrochloride] Anaphylaxis       PHYSICAL EXAM  VITAL SIGNS: BP (!) 111/93   Pulse 86   Temp 37.4 °C (99.4 °F) (Temporal)   Resp 18   Ht 1.626 m (5' 4\")   Wt 105 kg (230 lb 9.6 oz)   LMP 12/09/2022 (Within Weeks)   SpO2 95%   BMI 39.58 kg/m²   Vitals reviewed by myself.  Nursing note and vitals reviewed.  Constitutional: Well-developed and well-nourished. No acute distress.  HENT: Head is normocephalic and atraumatic.  Eyes: extra-ocular movements intact  Cardiovascular: Regular rate and regular rhythm. No murmur heard.  Pulmonary/Chest: Scant diffuse expiratory wheezes. No rales.   Abdominal: Soft and non-tender. No distention.    Musculoskeletal: Extremities exhibit normal range of motion without edema or tenderness.   Neurological: Awake and alert  Skin: Skin is warm and dry. No rash.     RADIOLOGY    DX-CHEST-PORTABLE (1 VIEW)   Final Result      No acute cardiac or pulmonary abnormalities are identified. Lung volumes are low.        The radiologist's interpretation of all radiological studies have " and rhythm, no murmur  Pulmonary: Lungs clear bilaterally with good aeration. No adventitious breath sounds.  Skin: No rashes      Procedures    Point of Care Test & Imaging Results from this visit  No results found for this visit on 01/03/25.   No results found.    Diagnostic study results (if any) were reviewed by AMISH Eagle.    Assessment/Plan   Allergies, medications, history, and pertinent labs/EKGs/Imaging reviewed by AMISH Eagle.     Medical Decision Making  Treatment: Doxycycline, medrol dose pack, tessalon  Differential: 1) sinusitis, 2) uri, 3) congestion  Plan: Patient will follow up with the PCP in the next 2-3 days. Return for any worsening symptoms or go to the ER for further evaluation. Patient understands return precautions and discharge insturctions.  Impression:   1) sinusitis      Orders and Diagnoses  Diagnoses and all orders for this visit:  Acute non-recurrent maxillary sinusitis  -     doxycycline (Vibramycin) 100 mg capsule; Take 1 capsule (100 mg) by mouth 2 times a day for 10 days. Take with at least 8 ounces (large glass) of water, do not lie down for 30 minutes after  -     methylPREDNISolone (Medrol Dospak) 4 mg tablets; Follow schedule on package instructions  -     benzonatate (Tessalon) 200 mg capsule; Take 1 capsule (200 mg) by mouth 3 times a day as needed for cough. Do not crush or chew.      Medical Admin Record      Patient disposition: Home    Electronically signed by AMISH Eagle  3:48 PM       been reviewed by me.      COURSE & MEDICAL DECISION MAKING    ED Observation Status? No; Patient does not meet criteria for ED Observation.     INITIAL ASSESSMENT AND PLAN    Patient is a 37-year-old female who presents for evaluation of cough and shortness of breath.  Differential diagnosis includes chronic bronchitis, acute exacerbation of chronic bronchitis, pneumonia.  Diagnostic work-up includes chest x-ray.       REASSESSMENTS   10:30 AM - Patient seen and examined at bedside. She will be treated for shortness of breath. Ordered for imaging to evaluate.    11:18 AM - Patient reevaluated at bedside. I updated her on the plan for discharge. Discussed discharge instructions and return precautions with the patient and they were cleared for discharge. Patient was given the opportunity to ask any further questions. She is comfortable with discharge at this time.       ER COURSE AND FINAL DISPOSITION   Patient initial vitals are within normal limits, she is not hypoxic.  She is noted to have diffuse wheezing in all lung fields and is therefore treated with albuterol and prednisone.  Chest x-ray returns and demonstrates no evidence of pneumonia.  Therefore she is reassured and advised that this is likely an exacerbation of her chronic bronchitis.  She will be started on albuterol and 5-day course of prednisone.  She is then given strict return precautions and discharged in stable condition.    ADDITIONAL PROBLEM LIST AND RESOURCE UTILIZATION    Additional problems aside from the chief complaint that I have addressed: None    I have discussed management of the patient with the following physicians and JONO's: None    Discussion of management with other [facility] or appropriate source(s): I spoke with social work to help with patient obtaining prescriptions, we will do meds to beds    Escalation of care considered, and ultimately not performed: See above.     Barriers to care at this time, including but not limited to:   Patient reports visual impairment that makes it difficult for her to obtain prescriptions .     Decision tools and prescription drugs considered including, but not limited to: See above.    Please see review of records as noted above    Patient will be discharged home.    FOLLOW UP:  Oscar Hernandez M.D.  [address] [address]  [phone]            OUTPATIENT MEDICATIONS:  New Prescriptions    ALBUTEROL 108 (90 BASE) MCG/ACT AERO SOLN INHALATION AEROSOL    Inhale 2 Puffs every 6 hours as needed for Shortness of Breath.    BENZOCAINE-MENTHOL (CEPACOL) 15-3.6 MG LOZENGE    Dissolve 1 Lozenge in the mouth every 2 hours as needed (cough).    PREDNISONE (DELTASONE) 20 MG TAB    Take 2 Tablets by mouth every day for 5 days.       FINAL IMPRESSION  1. Acute cough    2. Wheezing          John REDMAN (Scribe), am scribing for, and in the presence of, Yane Zepeda M.D..    Electronically signed by: John Rangel (Rolandoibe), 2/18/2023    Yane REDMAN M.D. personally performed the services described in this documentation, as scribed by John Rangel in my presence, and it is both accurate and complete.    The note accurately reflects work and decisions made by me.  Yane Zepeda M.D.  2/18/2023  2:44 PM

## 2025-01-08 ENCOUNTER — HOSPITAL ENCOUNTER (OUTPATIENT)
Dept: RADIOLOGY | Facility: MEDICAL CENTER | Age: 40
End: 2025-01-08
Attending: PHYSICIAN ASSISTANT
Payer: COMMERCIAL

## 2025-01-08 ENCOUNTER — OFFICE VISIT (OUTPATIENT)
Dept: URGENT CARE | Facility: PHYSICIAN GROUP | Age: 40
End: 2025-01-08
Payer: COMMERCIAL

## 2025-01-08 VITALS
HEART RATE: 96 BPM | DIASTOLIC BLOOD PRESSURE: 60 MMHG | WEIGHT: 276 LBS | OXYGEN SATURATION: 97 % | TEMPERATURE: 97.7 F | BODY MASS INDEX: 47.12 KG/M2 | SYSTOLIC BLOOD PRESSURE: 112 MMHG | HEIGHT: 64 IN | RESPIRATION RATE: 18 BRPM

## 2025-01-08 DIAGNOSIS — M79.631 RIGHT FOREARM PAIN: ICD-10-CM

## 2025-01-08 DIAGNOSIS — R11.0 NAUSEA: ICD-10-CM

## 2025-01-08 DIAGNOSIS — G56.11 MEDIAN NERVE DYSFUNCTION, RIGHT: ICD-10-CM

## 2025-01-08 PROCEDURE — 99214 OFFICE O/P EST MOD 30 MIN: CPT | Performed by: PHYSICIAN ASSISTANT

## 2025-01-08 PROCEDURE — 3074F SYST BP LT 130 MM HG: CPT | Performed by: PHYSICIAN ASSISTANT

## 2025-01-08 PROCEDURE — 3078F DIAST BP <80 MM HG: CPT | Performed by: PHYSICIAN ASSISTANT

## 2025-01-08 PROCEDURE — 73090 X-RAY EXAM OF FOREARM: CPT | Mod: RT

## 2025-01-08 RX ORDER — METHYLPREDNISOLONE 4 MG/1
TABLET ORAL
Qty: 21 TABLET | Refills: 0 | Status: SHIPPED | OUTPATIENT
Start: 2025-01-08

## 2025-01-08 RX ORDER — ONDANSETRON 4 MG/1
4 TABLET, FILM COATED ORAL EVERY 8 HOURS PRN
Qty: 9 TABLET | Refills: 0 | Status: SHIPPED | OUTPATIENT
Start: 2025-01-08 | End: 2025-01-11

## 2025-01-08 RX ORDER — SUMATRIPTAN SUCCINATE 100 MG/1
TABLET ORAL
COMMUNITY
Start: 2024-12-02

## 2025-01-08 ASSESSMENT — ENCOUNTER SYMPTOMS
SENSORY CHANGE: 1
VOMITING: 1
TINGLING: 1
NECK PAIN: 0
BACK PAIN: 0
FALLS: 0
DIARRHEA: 0
NAUSEA: 1

## 2025-01-08 ASSESSMENT — FIBROSIS 4 INDEX: FIB4 SCORE: 0.69

## 2025-01-08 NOTE — PROGRESS NOTES
"Subjective     Yane Orlelana is a 39 y.o. female who presents with Arm Injury (R x 1.5 months while lifting Pallets.  Seen at ER 2 times but no Xray. ) and Medication Refill (Zofran for nausea. )          Patient is a 39-year-old female who presents to urgent care with her mother who also provides history today.  Patient describes an injury at the end of November while she was holding up a metal sheet when this shifted while she was holding such overhead.  Patient since has had 2 evaluations in the emergency room of which at that time her right shoulder was the most uncomfortable although having forearm complaints at that time.  Shoulder x-ray was negative and suspected strain versus median nerve compression were a concern.  Patient had no relief with muscle relaxant although reports that the oral steroid taper improved pain to her shoulder along with her forearm.  At this time she continues to have numbness and tingling into some of her fingers now she is having difficulty with some  as well.  Today her right shoulder is notably improved however continues to have discomfort into her right forearm with radiation of pain-numbness to tingling into her hand.  She denies any new injury.    Unrelated to today's visit patient is requesting refill on Zofran as she does report after getting off Invega she has had intermittent nausea.        Arm Injury  Associated symptoms include nausea and vomiting. Pertinent negatives include no neck pain.       Review of Systems   Gastrointestinal:  Positive for nausea and vomiting. Negative for diarrhea.   Musculoskeletal:  Positive for joint pain. Negative for back pain, falls and neck pain.   Neurological:  Positive for tingling and sensory change.              Objective     /60 (BP Location: Left arm, Patient Position: Sitting, BP Cuff Size: Large adult)   Pulse 96   Temp 36.5 °C (97.7 °F) (Temporal)   Resp 18   Ht 1.626 m (5' 4\")   Wt (!) 125 kg (276 lb)   " SpO2 97%   BMI 47.38 kg/m²    PMH:  has a past medical history of ASTHMA (2006), Chlamydia (05/2010), Fall (06/22/2010), Migraine, Other specified symptom associated with female genital organs, Psychiatric disorder (2008), Psychiatric problem, Recurrent UTI (2010), Schizoaffective disorder (HCC), Substance abuse (HCC), Urinary tract infection, and Vaginal yeast infections (2010).  MEDS: Reviewed .   ALLERGIES:   Allergies   Allergen Reactions    Geodon [Ziprasidone Hydrochloride] Anaphylaxis     SURGHX:   Past Surgical History:   Procedure Laterality Date    GYN SURGERY      abortions x2     SOCHX:  reports that she quit smoking about 6 years ago. Her smoking use included cigarettes. She has never used smokeless tobacco. She reports that she does not currently use alcohol. She reports that she does not currently use drugs after having used the following drugs: Marijuana.  FH: Family history was reviewed, no pertinent findings to report    Physical Exam  Vitals reviewed.   Constitutional:       General: She is not in acute distress.     Appearance: She is well-developed.   HENT:      Head: Normocephalic and atraumatic.   Eyes:      Conjunctiva/sclera: Conjunctivae normal.      Pupils: Pupils are equal, round, and reactive to light.   Neck:      Trachea: No tracheal deviation.   Cardiovascular:      Rate and Rhythm: Normal rate.   Pulmonary:      Effort: Pulmonary effort is normal.   Musculoskeletal:         General: Tenderness present. No swelling.        Arms:       Cervical back: Normal range of motion and neck supple.      Comments: Right shoulder: Full range of motion of right shoulder- neg. empty can-without drop arm.  Elbow nontender full range of motion at the elbow although supination pronation painful at the proximal forearm.  Localized tenderness to this region as well without noted ecchymosis or noted swelling.   is weaker on the right than the left.  Neurovascularly intact distally.  Hand is warm.   Pulses 2+.   Skin:     General: Skin is warm.      Comments: No rash to area exposed during the visit today.    Neurological:      Mental Status: She is alert and oriented to person, place, and time.      Coordination: Coordination normal.   Psychiatric:         Mood and Affect: Mood normal.         Behavior: Behavior normal.                             Assessment & Plan        Assessment & Plan  Right forearm pain    Orders:    DX-FOREARM RIGHT; Future    methylPREDNISolone (MEDROL DOSEPAK) 4 MG Tablet Therapy Pack; Follow schedule on package instructions.    Nausea    Orders:    ondansetron (ZOFRAN) 4 MG Tab tablet; Take 1 Tablet by mouth every 8 hours as needed for Nausea/Vomiting for up to 3 days.    Median nerve dysfunction, right    Orders:    methylPREDNISolone (MEDROL DOSEPAK) 4 MG Tablet Therapy Pack; Follow schedule on package instructions.                MDM/ Plan /Discussion:    Xr forearm- Negative without bony abnormality.     I suspect at this time patient is with nerve compression most likely tendinitis with significant improvement after steroid taper.  Patient does have a PCP of which she is to make appointment in 2 weeks for recheck as patient may need more sophisticated imaging studies along with potential physical therapy.  Elbow appears to be noninjured without any bony tenderness-and shoulder appears to be improving at this time as well.  Patient and mother both agree with the plan and understand.      Differential diagnosis, natural history, supportive care, and indications for immediate follow-up discussed. Side effects of OTC or prescribed medications discussed.      Follow-up as needed if symptoms worsen or fail to improve to PCP, Urgent care or Emergency Room.     I have personally reviewed prior external notes and test results pertinent to today's visit.  I have independently reviewed and interpreted all diagnostics ordered during this urgent care acute visit.   Discussed management  options (risks,benefits, and alternatives to treatment).    The patient and/or guardian demonstrated a good understanding and agreed with the treatment plan. And all questions were answered.  Please note that this dictation was created using voice recognition software. I have made a reasonable attempt to correct obvious errors, but I expect that there are errors of grammar and possibly content that I did not discover before finalizing the note.

## 2025-01-29 ENCOUNTER — OFFICE VISIT (OUTPATIENT)
Dept: URGENT CARE | Facility: PHYSICIAN GROUP | Age: 40
End: 2025-01-29
Payer: COMMERCIAL

## 2025-01-29 VITALS
RESPIRATION RATE: 16 BRPM | TEMPERATURE: 98.5 F | HEART RATE: 88 BPM | WEIGHT: 277.4 LBS | DIASTOLIC BLOOD PRESSURE: 86 MMHG | OXYGEN SATURATION: 98 % | HEIGHT: 63 IN | BODY MASS INDEX: 49.15 KG/M2 | SYSTOLIC BLOOD PRESSURE: 138 MMHG

## 2025-01-29 DIAGNOSIS — R05.1 ACUTE COUGH: ICD-10-CM

## 2025-01-29 DIAGNOSIS — K21.9 GASTROESOPHAGEAL REFLUX DISEASE, UNSPECIFIED WHETHER ESOPHAGITIS PRESENT: ICD-10-CM

## 2025-01-29 DIAGNOSIS — R19.7 NAUSEA VOMITING AND DIARRHEA: ICD-10-CM

## 2025-01-29 DIAGNOSIS — R11.2 NAUSEA VOMITING AND DIARRHEA: ICD-10-CM

## 2025-01-29 DIAGNOSIS — Z72.89 VAPES NON-NICOTINE CONTAINING SUBSTANCE: ICD-10-CM

## 2025-01-29 DIAGNOSIS — R19.6 HALITOSIS: ICD-10-CM

## 2025-01-29 RX ORDER — ONDANSETRON 4 MG/1
TABLET, ORALLY DISINTEGRATING ORAL
COMMUNITY
Start: 2025-01-15 | End: 2025-01-29

## 2025-01-29 RX ORDER — ONDANSETRON 4 MG/1
4 TABLET, ORALLY DISINTEGRATING ORAL EVERY 6 HOURS PRN
Qty: 15 TABLET | Refills: 0 | Status: SHIPPED | OUTPATIENT
Start: 2025-01-29

## 2025-01-29 RX ORDER — LOPERAMIDE HYDROCHLORIDE 2 MG/1
2 CAPSULE ORAL 4 TIMES DAILY PRN
Qty: 30 CAPSULE | Refills: 0 | Status: SHIPPED | OUTPATIENT
Start: 2025-01-29

## 2025-01-29 RX ORDER — ALPRAZOLAM 0.5 MG
TABLET ORAL
COMMUNITY
Start: 2025-01-15

## 2025-01-29 RX ORDER — SUCRALFATE 1 G/1
1 TABLET ORAL
Qty: 90 TABLET | Refills: 0 | Status: SHIPPED | OUTPATIENT
Start: 2025-01-29

## 2025-01-29 ASSESSMENT — ENCOUNTER SYMPTOMS
BLOOD IN STOOL: 0
HEARTBURN: 0
SHORTNESS OF BREATH: 0
WHEEZING: 0
NAUSEA: 1
NECK PAIN: 0
VOMITING: 1
CONSTIPATION: 0
FLANK PAIN: 0
CHILLS: 0
FEVER: 0
MYALGIAS: 0
NUMBER OF EPISODES OF EMESIS TODAY: 1
BACK PAIN: 0
DIZZINESS: 0
COUGH: 1
SPUTUM PRODUCTION: 0
ABDOMINAL PAIN: 0
DIARRHEA: 1

## 2025-01-29 ASSESSMENT — FIBROSIS 4 INDEX: FIB4 SCORE: 0.69

## 2025-01-29 NOTE — PROGRESS NOTES
"Subjective:   Yane Orellana is a 39 y.o. female who presents for Emesis (Vomiting, diarrhea,body ache,fever,chills x 3 weeks )      Patient presents with complaints of nausea vomiting diarrhea, sour/foul taste in breath, and acute cough.  Patient states about 3 weeks ago her and her family had what sounds like a \"stomach bug.\"  Patient states that her father is still having some diarrhea, though others have had resolution of symptoms.  Patient states that she has \"always had stomach issues\" and that they have been exacerbated during this illness.  She states that she has not been able to keep food down due to nausea, and that she has had persistent diarrhea.  She denies any focal abdominal pain, does state some generalized cramping and soreness from vomiting going to the bathroom often.  She denies any hematemesis or hematochezia, no dysuria or urological symptoms.  Has been trying to treat her symptoms with previously prescribed Zofran which has been helpful as well as diet modification.  No fever chills body aches, no recent travel, no chest pain or shortness of breath    Emesis  Associated symptoms include coughing, nausea and vomiting. Pertinent negatives include no abdominal pain, chills, fever, myalgias or neck pain.       Review of Systems   Constitutional:  Negative for chills and fever.   Respiratory:  Positive for cough. Negative for sputum production, shortness of breath and wheezing.    Gastrointestinal:  Positive for diarrhea, nausea and vomiting. Negative for abdominal pain, blood in stool, constipation, heartburn and melena.   Genitourinary:  Negative for dysuria, flank pain, frequency, hematuria and urgency.   Musculoskeletal:  Negative for back pain, myalgias and neck pain.   Neurological:  Negative for dizziness.   All other systems reviewed and are negative.    Refer to HPI for additional details.    During this visit, appropriate PPE was worn, and hand hygiene was performed.    PMH:  has a " past medical history of ASTHMA (2006), Chlamydia (05/2010), Fall (06/22/2010), Migraine, Other specified symptom associated with female genital organs, Psychiatric disorder (2008), Psychiatric problem, Recurrent UTI (2010), Schizoaffective disorder (HCC), Substance abuse (HCC), Urinary tract infection, and Vaginal yeast infections (2010).    MEDS:   Current Outpatient Medications:     ALPRAZolam (XANAX) 0.5 MG Tab, , Disp: , Rfl:     Bismuth Subsalicylate 262 MG Tab, Take 1-2 Tablets by mouth 3 times a day as needed (nausea/vomiting/diarrhea)., Disp: 60 Tablet, Rfl: 0    loperamide (IMODIUM) 2 MG Cap, Take 1 Capsule by mouth 4 times a day as needed for Diarrhea (take only if other first line medications are not effective)., Disp: 30 Capsule, Rfl: 0    ondansetron (ZOFRAN ODT) 4 MG TABLET DISPERSIBLE, Take 1 Tablet by mouth every 6 hours as needed for Nausea/Vomiting for up to 15 doses., Disp: 15 Tablet, Rfl: 0    sucralfate (CARAFATE) 1 GM Tab, Take 1 Tablet by mouth 3 times a day before meals., Disp: 90 Tablet, Rfl: 0    Lidocaine HCl 4 % Cream, Apply 1 Application topically in the morning, at noon, and at bedtime., Disp: 80 mL, Rfl: 0    gabapentin (NEURONTIN) 100 MG Cap, Take 200 mg by mouth 2 times a day., Disp: , Rfl:     sumatriptan (IMITREX) 100 MG tablet, TAKE 1 TABLET BY MOUTH AT ONSET OF HEADACHE (Patient not taking: Reported on 1/29/2025), Disp: , Rfl:     methylPREDNISolone (MEDROL DOSEPAK) 4 MG Tablet Therapy Pack, Follow schedule on package instructions. (Patient not taking: Reported on 1/29/2025), Disp: 21 Tablet, Rfl: 0    cyclobenzaprine (FLEXERIL) 10 mg Tab, Take 1 Tablet by mouth 3 times a day as needed for Moderate Pain. (Patient not taking: Reported on 1/29/2025), Disp: 30 Tablet, Rfl: 0    fluticasone (FLONASE) 50 MCG/ACT nasal spray, 2 Sprays every day. (Patient not taking: Reported on 1/29/2025), Disp: , Rfl:     propranolol (INDERAL) 20 MG Tab, Take 20 mg by mouth 2 times a day as needed.  "(Patient not taking: Reported on 9/13/2024), Disp: , Rfl:     SUMAtriptan (IMITREX) 50 MG Tab, Take 1 tab at initial onset of symptoms.  Take 2nd tab after 2 hrs if symptoms have not improved. Maximum dose: 100 mg per dose; 200 mg per 24 hours. (Patient not taking: Reported on 9/13/2024), Disp: 9 Tablet, Rfl: 0    albuterol 108 (90 Base) MCG/ACT Aero Soln inhalation aerosol, Inhale 2 Puffs every 6 hours as needed for Shortness of Breath. (Patient not taking: Reported on 1/29/2025), Disp: 8.5 g, Rfl: 0    Acetaminophen (TYLENOL EXTRA STRENGTH PO), Take 1 Tablet by mouth 1 time a day as needed (PAIN). (Patient not taking: Reported on 1/29/2025), Disp: , Rfl:     multivitamin Tab, Take 1 Tablet by mouth every day. (Patient not taking: Reported on 9/13/2024), Disp: , Rfl:     ALLERGIES:   Allergies   Allergen Reactions    Geodon [Ziprasidone Hydrochloride] Anaphylaxis     SURGHX:   Past Surgical History:   Procedure Laterality Date    GYN SURGERY      abortions x2     SOCHX:  reports that she quit smoking about 6 years ago. Her smoking use included cigarettes. She has never used smokeless tobacco. She reports that she does not currently use alcohol. She reports that she does not currently use drugs after having used the following drugs: Marijuana.    FH: Per HPI as applicable/pertinent.    Medications, Allergies, and current problem list reviewed today in Epic.     Objective:     /86   Pulse 88   Temp 36.9 °C (98.5 °F)   Resp 16   Ht 1.6 m (5' 3\")   Wt (!) 126 kg (277 lb 6.4 oz)   SpO2 98%     Physical Exam  Vitals reviewed.   Constitutional:       General: She is not in acute distress.     Appearance: She is not ill-appearing.   HENT:      Mouth/Throat:      Mouth: Mucous membranes are moist.      Pharynx: Oropharynx is clear. Posterior oropharyngeal erythema present.      Comments: Halitosis.  No evidence of esophagitis on gross exam of the posterior oropharynx  Cardiovascular:      Rate and Rhythm: Normal " rate.      Pulses: Normal pulses.   Pulmonary:      Effort: Pulmonary effort is normal.   Abdominal:      General: Abdomen is flat. There is no distension.      Tenderness: There is no abdominal tenderness. There is no right CVA tenderness, left CVA tenderness, guarding or rebound.   Genitourinary:     Comments: Deferred and not indicated  Skin:     General: Skin is warm and dry.   Neurological:      Mental Status: She is alert.         Assessment/Plan:     Diagnosis and associated orders:     1. Nausea vomiting and diarrhea  - Bismuth Subsalicylate 262 MG Tab; Take 1-2 Tablets by mouth 3 times a day as needed (nausea/vomiting/diarrhea).  Dispense: 60 Tablet; Refill: 0  - loperamide (IMODIUM) 2 MG Cap; Take 1 Capsule by mouth 4 times a day as needed for Diarrhea (take only if other first line medications are not effective).  Dispense: 30 Capsule; Refill: 0  - ondansetron (ZOFRAN ODT) 4 MG TABLET DISPERSIBLE; Take 1 Tablet by mouth every 6 hours as needed for Nausea/Vomiting for up to 15 doses.  Dispense: 15 Tablet; Refill: 0  - Referral to Gastroenterology    2. Gastroesophageal reflux disease, unspecified whether esophagitis present  - sucralfate (CARAFATE) 1 GM Tab; Take 1 Tablet by mouth 3 times a day before meals.  Dispense: 90 Tablet; Refill: 0  - Referral to Gastroenterology    3. Acute cough  - Referral to Gastroenterology    4. Halitosis  - Referral to Gastroenterology    5. Vapes non-nicotine containing substance    Other orders  - ALPRAZolam (XANAX) 0.5 MG Tab     Comments/MDM:     Patient history and physical exam are consistent with acute nausea vomiting diarrhea with concomitant acid reflux, acute cough, and halitosis.  Patient presents with no red flag symptoms endorsed or found on physical exam.  I did discuss HPI as well as the above mention exam findings with patient, we have high suspicion for possible acute gastroenteritis with concomitant GERD.  Patient has had a history of GERD, states she has  "not taken omeprazole due to potential side effects that she is hard of.  I did discuss that it would be a good idea for her to be evaluated by gastroenterology as this seems to be an ongoing chronic problem, as well as do possible urea breath test to rule out H. pylori infection.  Patient does not endorse any abdominal pain though given halitosis cough and GERD do have high suspicion.  Patient is on board with plan of care  In clinic GI cocktail was given, patient stated forward her marked relief  Outpatient management will consist of referral to gastroenterology, as needed Zofran, sucralfate with meals, bismuth for diarrhea, Imodium for persistent diarrhea, copious fluids and electrolytes, monitor symptoms  Follow up in 3-5 days if no improvement in symptoms    ED precautions given for new or worsening symptoms, fever chills body aches  Discussion and collaborative decision making used with the patient myself to develop treatment plan.  Patient understands the treatment plan of care, and further follow-up if needed.  No further questions    I spent 3 minutes discussing patient's tobacco use.  Patient endorses using cannabis vape products.  They are interested in quitting and states they only use \"every few months.\"  I discussed various health concerns relating to vape use including current symptoms of halitosis, GERD, as well as, hypertension, cancer, pulmonary/cardiovascular disease, as well as secondhand exposure risks.  I discussed potential cessation options such as tobacco/nicotine replacement products, cessation and reduction of tobacco intake, discussing with PCP about potential medication use, as well as psychotherapy use.           Differential diagnosis, natural history, supportive care, and indications for immediate follow-up discussed.    Advised the patient to follow-up with the primary care physician for recheck, reevaluation, and consideration of further management.    Please note that this dictation " was created using voice recognition software. I have made a reasonable attempt to correct obvious errors, but I expect that there are errors of grammar and possibly content that I did not discover before finalizing the note.    This note was electronically signed by HARI Keys

## 2025-04-27 ENCOUNTER — HOSPITAL ENCOUNTER (EMERGENCY)
Facility: MEDICAL CENTER | Age: 40
End: 2025-04-27
Attending: EMERGENCY MEDICINE
Payer: COMMERCIAL

## 2025-04-27 ENCOUNTER — APPOINTMENT (OUTPATIENT)
Dept: RADIOLOGY | Facility: MEDICAL CENTER | Age: 40
End: 2025-04-27
Attending: EMERGENCY MEDICINE
Payer: COMMERCIAL

## 2025-04-27 VITALS
RESPIRATION RATE: 16 BRPM | WEIGHT: 283.07 LBS | SYSTOLIC BLOOD PRESSURE: 150 MMHG | DIASTOLIC BLOOD PRESSURE: 74 MMHG | TEMPERATURE: 98.1 F | HEART RATE: 59 BPM | OXYGEN SATURATION: 97 % | HEIGHT: 64 IN | BODY MASS INDEX: 48.33 KG/M2

## 2025-04-27 DIAGNOSIS — B96.89 BACTERIAL VAGINITIS: ICD-10-CM

## 2025-04-27 DIAGNOSIS — R10.84 GENERALIZED ABDOMINAL PAIN: ICD-10-CM

## 2025-04-27 DIAGNOSIS — N76.0 BACTERIAL VAGINITIS: ICD-10-CM

## 2025-04-27 LAB
ALBUMIN SERPL BCP-MCNC: 4.1 G/DL (ref 3.2–4.9)
ALBUMIN/GLOB SERPL: 1.3 G/DL
ALP SERPL-CCNC: 77 U/L (ref 30–99)
ALT SERPL-CCNC: 18 U/L (ref 2–50)
ANION GAP SERPL CALC-SCNC: 12 MMOL/L (ref 7–16)
APPEARANCE UR: CLEAR
AST SERPL-CCNC: 20 U/L (ref 12–45)
BACTERIA #/AREA URNS HPF: ABNORMAL /HPF
BASOPHILS # BLD AUTO: 0.6 % (ref 0–1.8)
BASOPHILS # BLD: 0.05 K/UL (ref 0–0.12)
BILIRUB SERPL-MCNC: 0.3 MG/DL (ref 0.1–1.5)
BILIRUB UR QL STRIP.AUTO: NEGATIVE
BUN SERPL-MCNC: 8 MG/DL (ref 8–22)
C TRACH DNA SPEC QL NAA+PROBE: NEGATIVE
CALCIUM ALBUM COR SERPL-MCNC: 8.4 MG/DL (ref 8.5–10.5)
CALCIUM SERPL-MCNC: 8.5 MG/DL (ref 8.5–10.5)
CANDIDA DNA VAG QL PROBE+SIG AMP: NEGATIVE
CASTS URNS QL MICRO: ABNORMAL /LPF (ref 0–2)
CHLORIDE SERPL-SCNC: 105 MMOL/L (ref 96–112)
CO2 SERPL-SCNC: 21 MMOL/L (ref 20–33)
COLOR UR: YELLOW
CREAT SERPL-MCNC: 0.76 MG/DL (ref 0.5–1.4)
EOSINOPHIL # BLD AUTO: 0.16 K/UL (ref 0–0.51)
EOSINOPHIL NFR BLD: 1.9 % (ref 0–6.9)
EPITHELIAL CELLS 1715: ABNORMAL /HPF (ref 0–5)
ERYTHROCYTE [DISTWIDTH] IN BLOOD BY AUTOMATED COUNT: 43.3 FL (ref 35.9–50)
G VAGINALIS DNA VAG QL PROBE+SIG AMP: POSITIVE
GFR SERPLBLD CREATININE-BSD FMLA CKD-EPI: 102 ML/MIN/1.73 M 2
GLOBULIN SER CALC-MCNC: 3.2 G/DL (ref 1.9–3.5)
GLUCOSE SERPL-MCNC: 86 MG/DL (ref 65–99)
GLUCOSE UR STRIP.AUTO-MCNC: NEGATIVE MG/DL
HCG SERPL QL: NEGATIVE
HCT VFR BLD AUTO: 39.9 % (ref 37–47)
HGB BLD-MCNC: 13.4 G/DL (ref 12–16)
IMM GRANULOCYTES # BLD AUTO: 0.04 K/UL (ref 0–0.11)
IMM GRANULOCYTES NFR BLD AUTO: 0.5 % (ref 0–0.9)
KETONES UR STRIP.AUTO-MCNC: NEGATIVE MG/DL
LEUKOCYTE ESTERASE UR QL STRIP.AUTO: ABNORMAL
LIPASE SERPL-CCNC: 64 U/L (ref 11–82)
LYMPHOCYTES # BLD AUTO: 2.22 K/UL (ref 1–4.8)
LYMPHOCYTES NFR BLD: 26.7 % (ref 22–41)
MCH RBC QN AUTO: 30.8 PG (ref 27–33)
MCHC RBC AUTO-ENTMCNC: 33.6 G/DL (ref 32.2–35.5)
MCV RBC AUTO: 91.7 FL (ref 81.4–97.8)
MICRO URNS: ABNORMAL
MONOCYTES # BLD AUTO: 0.53 K/UL (ref 0–0.85)
MONOCYTES NFR BLD AUTO: 6.4 % (ref 0–13.4)
N GONORRHOEA DNA SPEC QL NAA+PROBE: NEGATIVE
NEUTROPHILS # BLD AUTO: 5.33 K/UL (ref 1.82–7.42)
NEUTROPHILS NFR BLD: 63.9 % (ref 44–72)
NITRITE UR QL STRIP.AUTO: NEGATIVE
NRBC # BLD AUTO: 0 K/UL
NRBC BLD-RTO: 0 /100 WBC (ref 0–0.2)
PH UR STRIP.AUTO: 5.5 [PH] (ref 5–8)
PLATELET # BLD AUTO: 370 K/UL (ref 164–446)
PMV BLD AUTO: 9.2 FL (ref 9–12.9)
POTASSIUM SERPL-SCNC: 4.1 MMOL/L (ref 3.6–5.5)
PROT SERPL-MCNC: 7.3 G/DL (ref 6–8.2)
PROT UR QL STRIP: NEGATIVE MG/DL
RBC # BLD AUTO: 4.35 M/UL (ref 4.2–5.4)
RBC # URNS HPF: ABNORMAL /HPF (ref 0–2)
RBC UR QL AUTO: ABNORMAL
SODIUM SERPL-SCNC: 138 MMOL/L (ref 135–145)
SP GR UR STRIP.AUTO: 1.01
SPECIMEN SOURCE: NORMAL
T VAGINALIS DNA VAG QL PROBE+SIG AMP: NEGATIVE
UROBILINOGEN UR STRIP.AUTO-MCNC: 0.2 EU/DL
WBC # BLD AUTO: 8.3 K/UL (ref 4.8–10.8)
WBC #/AREA URNS HPF: ABNORMAL /HPF

## 2025-04-27 PROCEDURE — 81001 URINALYSIS AUTO W/SCOPE: CPT

## 2025-04-27 PROCEDURE — 96374 THER/PROPH/DIAG INJ IV PUSH: CPT

## 2025-04-27 PROCEDURE — 87510 GARDNER VAG DNA DIR PROBE: CPT

## 2025-04-27 PROCEDURE — 99285 EMERGENCY DEPT VISIT HI MDM: CPT

## 2025-04-27 PROCEDURE — 87660 TRICHOMONAS VAGIN DIR PROBE: CPT

## 2025-04-27 PROCEDURE — 36415 COLL VENOUS BLD VENIPUNCTURE: CPT

## 2025-04-27 PROCEDURE — 700111 HCHG RX REV CODE 636 W/ 250 OVERRIDE (IP): Mod: JZ,UD | Performed by: EMERGENCY MEDICINE

## 2025-04-27 PROCEDURE — 87480 CANDIDA DNA DIR PROBE: CPT

## 2025-04-27 PROCEDURE — 87491 CHLMYD TRACH DNA AMP PROBE: CPT

## 2025-04-27 PROCEDURE — 74176 CT ABD & PELVIS W/O CONTRAST: CPT

## 2025-04-27 PROCEDURE — 85025 COMPLETE CBC W/AUTO DIFF WBC: CPT

## 2025-04-27 PROCEDURE — 83690 ASSAY OF LIPASE: CPT

## 2025-04-27 PROCEDURE — 80053 COMPREHEN METABOLIC PANEL: CPT

## 2025-04-27 PROCEDURE — 84703 CHORIONIC GONADOTROPIN ASSAY: CPT

## 2025-04-27 PROCEDURE — 87591 N.GONORRHOEAE DNA AMP PROB: CPT

## 2025-04-27 PROCEDURE — 700105 HCHG RX REV CODE 258: Mod: UD | Performed by: EMERGENCY MEDICINE

## 2025-04-27 RX ORDER — ACETAMINOPHEN 10 MG/ML
1000 INJECTION, SOLUTION INTRAVENOUS ONCE
Status: COMPLETED | OUTPATIENT
Start: 2025-04-27 | End: 2025-04-27

## 2025-04-27 RX ORDER — SODIUM CHLORIDE 9 MG/ML
1000 INJECTION, SOLUTION INTRAVENOUS ONCE
Status: COMPLETED | OUTPATIENT
Start: 2025-04-27 | End: 2025-04-27

## 2025-04-27 RX ORDER — METRONIDAZOLE 500 MG/1
500 TABLET ORAL 3 TIMES DAILY
Qty: 21 TABLET | Refills: 0 | Status: ACTIVE | OUTPATIENT
Start: 2025-04-27 | End: 2025-05-04

## 2025-04-27 RX ORDER — KETOROLAC TROMETHAMINE 15 MG/ML
15 INJECTION, SOLUTION INTRAMUSCULAR; INTRAVENOUS ONCE
Status: DISCONTINUED | OUTPATIENT
Start: 2025-04-27 | End: 2025-04-27

## 2025-04-27 RX ORDER — ACETAMINOPHEN 325 MG/1
650 TABLET ORAL ONCE
Status: DISCONTINUED | OUTPATIENT
Start: 2025-04-27 | End: 2025-04-27

## 2025-04-27 RX ORDER — METRONIDAZOLE 500 MG/1
500 TABLET ORAL 3 TIMES DAILY
Qty: 21 TABLET | Refills: 0 | Status: ACTIVE | OUTPATIENT
Start: 2025-04-27 | End: 2025-04-27

## 2025-04-27 RX ADMIN — ACETAMINOPHEN 1000 MG: 1000 INJECTION, SOLUTION INTRAVENOUS at 08:25

## 2025-04-27 RX ADMIN — SODIUM CHLORIDE 1000 ML: 9 INJECTION, SOLUTION INTRAVENOUS at 08:25

## 2025-04-27 ASSESSMENT — FIBROSIS 4 INDEX: FIB4 SCORE: 0.69

## 2025-04-27 NOTE — ED PROVIDER NOTES
CHIEF COMPLAINT  Chief Complaint   Patient presents with    Blood in Urine    Painful Urination    Abdominal Pain     Bilateral flank       LIMITATION TO HISTORY   Select: none    HPI    Yane Orellana is a 39 y.o. female who presents to the Emergency Department for blood in her urine and back pain onset more than a week ago. The patient reports that she had woken up this morning and noticed that she continues to have blood in her urine, noting that it also has a yellow tinge to it. She also reports that she has been having a migraine as well. Patient reports that she had a similar problem about a year ago, noting that she had an infection that had spread to her uterus. Denies any history of kidney stones. She has taken Pamprin with antihistamine and caffeine due to feeling general malaise with mild to no alleviation. She states that she does not think the bleeding is from her menstrual cycle. Denies any chance of pregnancy. The patient also has a history of ovarian cysts. The patient is also complaining of abdominal pain/problems and has been seeing a GI doctor at [facility]. She reports that she had a test done and states that her doctor had told her that they were concerned for an intestine infection. She had not gotten the results from the test back yet. The patient has an allergy to Geodon.     OUTSIDE HISTORIAN(S):  Select: None    EXTERNAL RECORDS REVIEWED  Select: The patient was seen at Urgent Care on 25 for nausea, vomiting and diarrhea.     PAST MEDICAL HISTORY  Past Medical History:   Diagnosis Date    ASTHMA 2006    Chlamydia 2010    Fall 2010    1 week ago fell off a horse    Migraine     Other specified symptom associated with female genital organs      ab2    Psychiatric disorder 2008    Borderline personality disorder    Psychiatric problem     Bipolar and schizophrenia in family    Recurrent UTI 2010    Schizoaffective disorder (HCC)     Substance abuse (HCC)      Urinary tract infection     Recurrent UTI's 6-9 per year    Vaginal yeast infections 2010     SURGICAL HISTORY  Past Surgical History:   Procedure Laterality Date    GYN SURGERY      abortions x2     FAMILY HISTORY  Family History   Problem Relation Age of Onset    Diabetes Mother     Psychiatric Illness Mother     Diabetes Maternal Grandmother     Diabetes Maternal Grandfather       SOCIAL HISTORY  Social History     Tobacco Use    Smoking status: Former     Current packs/day: 0.00     Types: Cigarettes     Quit date: 2018     Years since quittin.6    Smokeless tobacco: Never   Vaping Use    Vaping status: Every Day    Substances: Nicotine   Substance Use Topics    Alcohol use: Not Currently     Comment: Occasionally    Drug use: Not Currently     Types: Marijuana     Comment: medical card      CURRENT MEDICATIONS  Current Outpatient Medications Prior to Encounter   Medication Sig Dispense Refill    ALPRAZolam (XANAX) 0.5 MG Tab       Bismuth Subsalicylate 262 MG Tab Take 1-2 Tablets by mouth 3 times a day as needed (nausea/vomiting/diarrhea). 60 Tablet 0    loperamide (IMODIUM) 2 MG Cap Take 1 Capsule by mouth 4 times a day as needed for Diarrhea (take only if other first line medications are not effective). 30 Capsule 0    ondansetron (ZOFRAN ODT) 4 MG TABLET DISPERSIBLE Take 1 Tablet by mouth every 6 hours as needed for Nausea/Vomiting for up to 15 doses. 15 Tablet 0    sucralfate (CARAFATE) 1 GM Tab Take 1 Tablet by mouth 3 times a day before meals. 90 Tablet 0    sumatriptan (IMITREX) 100 MG tablet TAKE 1 TABLET BY MOUTH AT ONSET OF HEADACHE (Patient not taking: Reported on 2025)      methylPREDNISolone (MEDROL DOSEPAK) 4 MG Tablet Therapy Pack Follow schedule on package instructions. (Patient not taking: Reported on 2025) 21 Tablet 0    cyclobenzaprine (FLEXERIL) 10 mg Tab Take 1 Tablet by mouth 3 times a day as needed for Moderate Pain. (Patient not taking: Reported on 2025) 30 Tablet  "0    Lidocaine HCl 4 % Cream Apply 1 Application topically in the morning, at noon, and at bedtime. 80 mL 0    fluticasone (FLONASE) 50 MCG/ACT nasal spray 2 Sprays every day. (Patient not taking: Reported on 1/29/2025)      gabapentin (NEURONTIN) 100 MG Cap Take 200 mg by mouth 2 times a day.      propranolol (INDERAL) 20 MG Tab Take 20 mg by mouth 2 times a day as needed. (Patient not taking: Reported on 9/13/2024)      SUMAtriptan (IMITREX) 50 MG Tab Take 1 tab at initial onset of symptoms.  Take 2nd tab after 2 hrs if symptoms have not improved. Maximum dose: 100 mg per dose; 200 mg per 24 hours. (Patient not taking: Reported on 9/13/2024) 9 Tablet 0    albuterol 108 (90 Base) MCG/ACT Aero Soln inhalation aerosol Inhale 2 Puffs every 6 hours as needed for Shortness of Breath. (Patient not taking: Reported on 1/29/2025) 8.5 g 0    Acetaminophen (TYLENOL EXTRA STRENGTH PO) Take 1 Tablet by mouth 1 time a day as needed (PAIN). (Patient not taking: Reported on 1/29/2025)      multivitamin Tab Take 1 Tablet by mouth every day. (Patient not taking: Reported on 9/13/2024)       ALLERGIES  Allergies   Allergen Reactions    Geodon [Ziprasidone Hydrochloride] Anaphylaxis     PHYSICAL EXAM  VITAL SIGNS:BP (!) 165/66   Pulse 72   Temp 36.7 °C (98.1 °F) (Temporal)   Resp 16   Ht 1.626 m (5' 4\")   Wt (!) 128 kg (283 lb 1.1 oz)   SpO2 95%   BMI 48.59 kg/m²       Constitutional: Well-developed no acute distress   HENT: Normocephalic, Atraumatic, Bilateral external ears normal.  Eyes:  conjunctiva are normal.   Neck: Supple.  Non tender midline  Cardiovascular: Regular rate and rhythm without murmurs gallops or rubs.   Thorax & Lungs: No respiratory distress. Breathing comfortably. Lungs are clear to auscultation bilaterally, there are no wheezes no rales. Chest wall is non tender.  Abdomen: Soft, Diffuse abdominal tenderness, No rebound tenderness, non distended  Skin: Warm, Dry, No erythema,   Back: Bilateral CVA " tenderness, No tenderness.  Musculoskeletal: No clubbing cyanosis or edema good range of motion   Neurologic: Alert & oriented x 3, normal sensation moving all extremities appears normal   Psychiatric: Affect normal, Judgment normal, Mood normal.       DIAGNOSTIC STUDIES / PROCEDURES    LABS  Results for orders placed or performed during the hospital encounter of 04/27/25   CBC WITH DIFFERENTIAL    Collection Time: 04/27/25  7:28 AM   Result Value Ref Range    WBC 8.3 4.8 - 10.8 K/uL    RBC 4.35 4.20 - 5.40 M/uL    Hemoglobin 13.4 12.0 - 16.0 g/dL    Hematocrit 39.9 37.0 - 47.0 %    MCV 91.7 81.4 - 97.8 fL    MCH 30.8 27.0 - 33.0 pg    MCHC 33.6 32.2 - 35.5 g/dL    RDW 43.3 35.9 - 50.0 fL    Platelet Count 370 164 - 446 K/uL    MPV 9.2 9.0 - 12.9 fL    Neutrophils-Polys 63.90 44.00 - 72.00 %    Lymphocytes 26.70 22.00 - 41.00 %    Monocytes 6.40 0.00 - 13.40 %    Eosinophils 1.90 0.00 - 6.90 %    Basophils 0.60 0.00 - 1.80 %    Immature Granulocytes 0.50 0.00 - 0.90 %    Nucleated RBC 0.00 0.00 - 0.20 /100 WBC    Neutrophils (Absolute) 5.33 1.82 - 7.42 K/uL    Lymphs (Absolute) 2.22 1.00 - 4.80 K/uL    Monos (Absolute) 0.53 0.00 - 0.85 K/uL    Eos (Absolute) 0.16 0.00 - 0.51 K/uL    Baso (Absolute) 0.05 0.00 - 0.12 K/uL    Immature Granulocytes (abs) 0.04 0.00 - 0.11 K/uL    NRBC (Absolute) 0.00 K/uL   COMP METABOLIC PANEL    Collection Time: 04/27/25  7:28 AM   Result Value Ref Range    Sodium 138 135 - 145 mmol/L    Potassium 4.1 3.6 - 5.5 mmol/L    Chloride 105 96 - 112 mmol/L    Co2 21 20 - 33 mmol/L    Anion Gap 12.0 7.0 - 16.0    Glucose 86 65 - 99 mg/dL    Bun 8 8 - 22 mg/dL    Creatinine 0.76 0.50 - 1.40 mg/dL    Calcium 8.5 8.5 - 10.5 mg/dL    Correct Calcium 8.4 (L) 8.5 - 10.5 mg/dL    AST(SGOT) 20 12 - 45 U/L    ALT(SGPT) 18 2 - 50 U/L    Alkaline Phosphatase 77 30 - 99 U/L    Total Bilirubin 0.3 0.1 - 1.5 mg/dL    Albumin 4.1 3.2 - 4.9 g/dL    Total Protein 7.3 6.0 - 8.2 g/dL    Globulin 3.2 1.9 - 3.5  g/dL    A-G Ratio 1.3 g/dL   LIPASE    Collection Time: 04/27/25  7:28 AM   Result Value Ref Range    Lipase 64 11 - 82 U/L   HCG QUAL SERUM    Collection Time: 04/27/25  7:28 AM   Result Value Ref Range    Beta-Hcg Qualitative Serum Negative Negative   ESTIMATED GFR    Collection Time: 04/27/25  7:28 AM   Result Value Ref Range    GFR (CKD-EPI) 102 >60 mL/min/1.73 m 2   URINALYSIS,CULTURE IF INDICATED    Collection Time: 04/27/25  7:35 AM    Specimen: Urine   Result Value Ref Range    Color Yellow     Character Clear     Specific Gravity 1.010 <1.035    Ph 5.5 5.0 - 8.0    Glucose Negative Negative mg/dL    Ketones Negative Negative mg/dL    Protein Negative Negative mg/dL    Bilirubin Negative Negative    Urobilinogen, Urine 0.2 <=1.0 EU/dL    Nitrite Negative Negative    Leukocyte Esterase Trace (A) Negative    Occult Blood Large (A) Negative    Micro Urine Req Microscopic    Chlamydia/GC, PCR (Urine)    Collection Time: 04/27/25  7:35 AM    Specimen: Urine   Result Value Ref Range    Source Urine    URINE MICROSCOPIC (W/UA)    Collection Time: 04/27/25  7:35 AM   Result Value Ref Range    WBC 0-2 /hpf    RBC  (A) 0 - 2 /hpf    Bacteria Rare (A) None /hpf    Epithelial Cells 3-5 0 - 5 /hpf    Urine Casts 0-2 0 - 2 /lpf   VAGINAL PATHOGENS DNA PANEL    Collection Time: 04/27/25  9:04 AM   Result Value Ref Range    Candida species DNA Probe Negative Negative    Trichomonas vaginalis DNA Probe Negative Negative    Gardnerella vaginalis DNA Probe POSITIVE (A) Negative     RADIOLOGY  I have independently interpreted the diagnostic imaging associated with this visit and am waiting the final reading from the radiologist.   My preliminary interpretation is as follows: CT scan shows no signs of hydronephrosis no free air no obstruction.    Radiologist interpretation:   CT-RENAL COLIC EVALUATION(A/P W/O)   Final Result      1.  No acute process seen. No renal stone.   2.  Hepatic steatosis.           COURSE & MEDICAL  DECISION MAKING    ED COURSE:    ED Observation Status? No, The patient does not qualify for observation status     INTERVENTIONS BY ME:  Medications   NS infusion 1,000 mL (0 mL Intravenous Stopped 4/27/25 9825)   acetaminophen (Ofirmev) injection 1,000 mg (0 mg Intravenous Stopped 4/27/25 8382)       7:40 AM - Patient seen and examined at bedside. Discussed plan of care, including ordering imaging and labs to further evaluate. Patient agrees to the plan of care. The patient will be medicated with Ofirmev injection 1,000 mg. Ordered for CT-Renal Colic Evaluation, Wet Prep, Chlamydia/GC, Urine microscopic, CBC with diff, CMP, Lipase, HCG qual serum, UA to evaluate her symptoms.     11:41 AM - Patient was reevaluated at bedside. Discussed lab and radiology results with the patient and informed the patient that her results are consistent with bacterial vaginosis and will be placed on antibiotics. I discussed plan for discharge with Flagyl and follow up as outlined below. The patient is stable for discharge at this time and will return for any new or worsening symptoms. Patient verbalizes understanding and support with my plan for discharge.      INITIAL ASSESSMENT, COURSE AND PLAN  Care Narrative: Urinalysis does show  red blood cells.  Concerns based on the patient's symptomatology are for intra-abdominal pathology such as a kidney stone infection.  Wet prep was done as well as gonorrhea chlamydia which are currently pending the wet prep is positive for Gardnerella negative for trichomoniasis or Candida.  Urinalysis shows only red blood cells rare bacteria.  Laboratory studies are otherwise unconcerning including a lipase.  At this point I do feel is most likely bacterial vaginosis causing the majority of the symptoms that the patient is having she is also describing some other chronic pains that has been going on in her back and her abdomen which she states this is not new it has been going on for some time  she does follow-up with a GI specialist.  At this point I start the patient on Flagyl for her bacterial vaginosis.  Recommend for the patient to follow with her primary care and for recheck in 1 week return as needed.       HYDRATION: Based on the patient's presentation of dehydration,  the patient was given IV fluids. IV Hydration was used because oral hydration is unable to be done due to the patient's symptoms. Upon recheck following hydration, the patient was improved.     ADDITIONAL PROBLEM LIST  None    DISPOSITION AND DISCUSSIONS  I have discussed management of the patient with the following physicians and JONO's: None    Escalation of care considered, and ultimately not performed: None    Barriers to care at this time, including but not limited to: None.     Decision tools and prescription drugs considered including, but not limited to: As described above.       The patient will return for new or worsening symptoms and is stable at the time of discharge.    The patient is referred to a primary physician for blood pressure management, diabetic screening, and for all other preventative health concerns.      DISPOSITION:  Patient will be discharged home in stable condition.    FOLLOW UP:  Chelsy Robison M.D.  [address] [place], [place] [id number]  [address]  [facility] [address]  [phone]    Schedule an appointment as soon as possible for a visit in 1 week  For re-check, Return if any symptoms worsen      OUTPATIENT MEDICATIONS:  Discharge Medication List as of 4/27/2025 12:30 PM          FINAL DIAGNOSIS  1. Bacterial vaginitis    2. Generalized abdominal pain         Melissa REDMAN), am scribing for, and in the presence of, Chau Merino M.D..    Electronically signed by: Melissa Baires), 4/27/2025    Chau REDMAN M.D. personally performed the services described in this documentation, as scribed by Melissa Merino in my presence, and it is both accurate and complete.     Electronically signed by: Chau  ROSA Merino M.D.,2:56 PM 04/27/25

## 2025-04-27 NOTE — ED NOTES
Chief Complaint   Patient presents with    Blood in Urine    Painful Urination    Abdominal Pain     Bilateral flank     Pt ambulated to triage with above complaints. Pt reports symptoms started with yeast infection a week ago. Provided with specimen cup.

## 2025-04-27 NOTE — ED NOTES
Patient discharged home per ERP.  Discharge teaching and education discussed with patient. POC discussed.   Patient verbalized understanding of discharge teaching and education. No other questions at this time.     RX x 1 sent to pharmacy by MD - order verified.   PIV removed.     VSS. Patient alert and oriented. Patient arranged ride for self. Able to ambulate off unit safely with steady gait.

## 2025-05-01 ENCOUNTER — OFFICE VISIT (OUTPATIENT)
Dept: URGENT CARE | Facility: PHYSICIAN GROUP | Age: 40
End: 2025-05-01
Payer: COMMERCIAL

## 2025-05-01 VITALS
OXYGEN SATURATION: 98 % | DIASTOLIC BLOOD PRESSURE: 80 MMHG | BODY MASS INDEX: 42.43 KG/M2 | HEART RATE: 77 BPM | WEIGHT: 279.98 LBS | SYSTOLIC BLOOD PRESSURE: 128 MMHG | TEMPERATURE: 98.2 F | RESPIRATION RATE: 16 BRPM | HEIGHT: 68 IN

## 2025-05-01 DIAGNOSIS — B96.89 BV (BACTERIAL VAGINOSIS): ICD-10-CM

## 2025-05-01 DIAGNOSIS — N76.0 BV (BACTERIAL VAGINOSIS): ICD-10-CM

## 2025-05-01 DIAGNOSIS — R30.0 DYSURIA: ICD-10-CM

## 2025-05-01 LAB
APPEARANCE UR: CLEAR
BILIRUB UR STRIP-MCNC: NEGATIVE MG/DL
COLOR UR AUTO: YELLOW
GLUCOSE UR STRIP.AUTO-MCNC: NEGATIVE MG/DL
KETONES UR STRIP.AUTO-MCNC: NEGATIVE MG/DL
LEUKOCYTE ESTERASE UR QL STRIP.AUTO: NEGATIVE
NITRITE UR QL STRIP.AUTO: NEGATIVE
PH UR STRIP.AUTO: 7 [PH] (ref 5–8)
PROT UR QL STRIP: NEGATIVE MG/DL
RBC UR QL AUTO: NORMAL
SP GR UR STRIP.AUTO: 1.02
UROBILINOGEN UR STRIP-MCNC: 0.2 MG/DL

## 2025-05-01 PROCEDURE — 3074F SYST BP LT 130 MM HG: CPT | Performed by: PHYSICIAN ASSISTANT

## 2025-05-01 PROCEDURE — 81002 URINALYSIS NONAUTO W/O SCOPE: CPT | Performed by: PHYSICIAN ASSISTANT

## 2025-05-01 PROCEDURE — 99213 OFFICE O/P EST LOW 20 MIN: CPT | Performed by: PHYSICIAN ASSISTANT

## 2025-05-01 PROCEDURE — 3079F DIAST BP 80-89 MM HG: CPT | Performed by: PHYSICIAN ASSISTANT

## 2025-05-01 RX ORDER — CLINDAMYCIN PHOSPHATE 20 MG/G
1 CREAM VAGINAL NIGHTLY
Qty: 40 G | Refills: 0 | Status: SHIPPED | OUTPATIENT
Start: 2025-05-01 | End: 2025-05-08

## 2025-05-01 RX ORDER — FLUCONAZOLE 150 MG/1
TABLET ORAL
Qty: 2 TABLET | Refills: 0 | Status: SHIPPED | OUTPATIENT
Start: 2025-05-01

## 2025-05-01 ASSESSMENT — FIBROSIS 4 INDEX: FIB4 SCORE: 0.48

## 2025-05-01 NOTE — PROGRESS NOTES
"Subjective:   Yane Orellana is a 39 y.o. female who presents for Vaginitis (Chronic in nature and antibiotics don't seem to be working /Was instructed to come back if symptoms get worse )      3 weeks ago suspected yeast infection treated with monistat  2 weeks ago noted subtle hematuria  Seen in er on 4/27/25 - dx'd with bv, metronidazole rx'd.   Since er visit notes worse vaginal discharge  Dysuria after urinating  Abd feels distended/swollen  First day of last menstrual period 3 weeks ago    ROS    Medications, Allergies, and current problem list reviewed today in Epic.     Objective:     /80 (BP Location: Right arm, Patient Position: Sitting, BP Cuff Size: Large adult)   Pulse 77   Temp 36.8 °C (98.2 °F) (Temporal)   Resp 16   Ht 1.727 m (5' 8\")   Wt (!) 127 kg (279 lb 15.8 oz)   SpO2 98%     Physical Exam  Vitals reviewed.   Constitutional:       Appearance: Normal appearance.   HENT:      Head: Normocephalic and atraumatic.      Right Ear: External ear normal.      Left Ear: External ear normal.      Nose: Nose normal.      Mouth/Throat:      Mouth: Mucous membranes are moist.   Eyes:      Conjunctiva/sclera: Conjunctivae normal.   Cardiovascular:      Rate and Rhythm: Normal rate.   Pulmonary:      Effort: Pulmonary effort is normal.   Abdominal:      Tenderness: There is no abdominal tenderness. There is no right CVA tenderness, left CVA tenderness, guarding or rebound.   Genitourinary:     Comments: Exam deferred  Skin:     General: Skin is warm and dry.      Capillary Refill: Capillary refill takes less than 2 seconds.   Neurological:      Mental Status: She is alert and oriented to person, place, and time.         Assessment/Plan:     Diagnosis and associated orders:     1. Dysuria  POCT Urinalysis      2. BV (bacterial vaginosis)  fluconazole (DIFLUCAN) 150 MG tablet    clindamycin (CLEOCIN) 2 % vaginal cream         Comments/MDM:     Patient essentially with BV that does not seem to " be improving after several days of oral metronidazole.  Recommend discontinue metronidazole start clindamycin intravaginal gel, will trial her on fluconazole even though her vaginal pathogen swab was negative given her ongoing pruritus         Differential diagnosis, natural history, supportive care, and indications for immediate follow-up discussed.    Advised the patient to follow-up with the primary care physician for recheck, reevaluation, and consideration of further management.    Please note that this dictation was created using voice recognition software. I have made a reasonable attempt to correct obvious errors, but I expect that there are errors of grammar and possibly content that I did not discover before finalizing the note.    This note was electronically signed by Humberto Madden PA-C

## 2025-05-05 ENCOUNTER — PHARMACY VISIT (OUTPATIENT)
Dept: PHARMACY | Facility: MEDICAL CENTER | Age: 40
End: 2025-05-05
Payer: COMMERCIAL

## 2025-05-05 ENCOUNTER — HOSPITAL ENCOUNTER (EMERGENCY)
Facility: MEDICAL CENTER | Age: 40
End: 2025-05-05
Attending: STUDENT IN AN ORGANIZED HEALTH CARE EDUCATION/TRAINING PROGRAM
Payer: COMMERCIAL

## 2025-05-05 VITALS
WEIGHT: 279.1 LBS | SYSTOLIC BLOOD PRESSURE: 137 MMHG | RESPIRATION RATE: 16 BRPM | DIASTOLIC BLOOD PRESSURE: 72 MMHG | TEMPERATURE: 97.9 F | OXYGEN SATURATION: 95 % | BODY MASS INDEX: 46.5 KG/M2 | HEIGHT: 65 IN | HEART RATE: 65 BPM

## 2025-05-05 DIAGNOSIS — R10.84 GENERALIZED ABDOMINAL PAIN: ICD-10-CM

## 2025-05-05 LAB
ALBUMIN SERPL BCP-MCNC: 4.3 G/DL (ref 3.2–4.9)
ALBUMIN/GLOB SERPL: 1.3 G/DL
ALP SERPL-CCNC: 80 U/L (ref 30–99)
ALT SERPL-CCNC: 51 U/L (ref 2–50)
ANION GAP SERPL CALC-SCNC: 12 MMOL/L (ref 7–16)
APPEARANCE UR: CLEAR
AST SERPL-CCNC: 50 U/L (ref 12–45)
BACTERIA #/AREA URNS HPF: ABNORMAL /HPF
BASOPHILS # BLD AUTO: 0.7 % (ref 0–1.8)
BASOPHILS # BLD: 0.06 K/UL (ref 0–0.12)
BILIRUB SERPL-MCNC: 0.4 MG/DL (ref 0.1–1.5)
BILIRUB UR QL STRIP.AUTO: NEGATIVE
BUN SERPL-MCNC: 10 MG/DL (ref 8–22)
CALCIUM ALBUM COR SERPL-MCNC: 9.2 MG/DL (ref 8.5–10.5)
CALCIUM SERPL-MCNC: 9.4 MG/DL (ref 8.5–10.5)
CASTS URNS QL MICRO: ABNORMAL /LPF (ref 0–2)
CHLORIDE SERPL-SCNC: 103 MMOL/L (ref 96–112)
CO2 SERPL-SCNC: 21 MMOL/L (ref 20–33)
COLOR UR: YELLOW
CREAT SERPL-MCNC: 0.75 MG/DL (ref 0.5–1.4)
EOSINOPHIL # BLD AUTO: 0.14 K/UL (ref 0–0.51)
EOSINOPHIL NFR BLD: 1.7 % (ref 0–6.9)
EPITHELIAL CELLS 1715: ABNORMAL /HPF (ref 0–5)
ERYTHROCYTE [DISTWIDTH] IN BLOOD BY AUTOMATED COUNT: 44.9 FL (ref 35.9–50)
GFR SERPLBLD CREATININE-BSD FMLA CKD-EPI: 104 ML/MIN/1.73 M 2
GLOBULIN SER CALC-MCNC: 3.3 G/DL (ref 1.9–3.5)
GLUCOSE SERPL-MCNC: 116 MG/DL (ref 65–99)
GLUCOSE UR STRIP.AUTO-MCNC: NEGATIVE MG/DL
HCG SERPL QL: NEGATIVE
HCT VFR BLD AUTO: 44.1 % (ref 37–47)
HGB BLD-MCNC: 14.3 G/DL (ref 12–16)
IMM GRANULOCYTES # BLD AUTO: 0.03 K/UL (ref 0–0.11)
IMM GRANULOCYTES NFR BLD AUTO: 0.4 % (ref 0–0.9)
KETONES UR STRIP.AUTO-MCNC: ABNORMAL MG/DL
LEUKOCYTE ESTERASE UR QL STRIP.AUTO: ABNORMAL
LIPASE SERPL-CCNC: 28 U/L (ref 11–82)
LYMPHOCYTES # BLD AUTO: 1.99 K/UL (ref 1–4.8)
LYMPHOCYTES NFR BLD: 24.4 % (ref 22–41)
MCH RBC QN AUTO: 30.4 PG (ref 27–33)
MCHC RBC AUTO-ENTMCNC: 32.4 G/DL (ref 32.2–35.5)
MCV RBC AUTO: 93.6 FL (ref 81.4–97.8)
MICRO URNS: ABNORMAL
MONOCYTES # BLD AUTO: 0.37 K/UL (ref 0–0.85)
MONOCYTES NFR BLD AUTO: 4.5 % (ref 0–13.4)
NEUTROPHILS # BLD AUTO: 5.55 K/UL (ref 1.82–7.42)
NEUTROPHILS NFR BLD: 68.3 % (ref 44–72)
NITRITE UR QL STRIP.AUTO: NEGATIVE
NRBC # BLD AUTO: 0 K/UL
NRBC BLD-RTO: 0 /100 WBC (ref 0–0.2)
PH UR STRIP.AUTO: 6.5 [PH] (ref 5–8)
PLATELET # BLD AUTO: 249 K/UL (ref 164–446)
PMV BLD AUTO: 10.2 FL (ref 9–12.9)
POTASSIUM SERPL-SCNC: 4.9 MMOL/L (ref 3.6–5.5)
PROT SERPL-MCNC: 7.6 G/DL (ref 6–8.2)
PROT UR QL STRIP: NEGATIVE MG/DL
RBC # BLD AUTO: 4.71 M/UL (ref 4.2–5.4)
RBC # URNS HPF: ABNORMAL /HPF (ref 0–2)
RBC UR QL AUTO: NEGATIVE
SODIUM SERPL-SCNC: 136 MMOL/L (ref 135–145)
SP GR UR STRIP.AUTO: 1.02
UROBILINOGEN UR STRIP.AUTO-MCNC: 0.2 EU/DL
WBC # BLD AUTO: 8.1 K/UL (ref 4.8–10.8)
WBC #/AREA URNS HPF: ABNORMAL /HPF

## 2025-05-05 PROCEDURE — 83690 ASSAY OF LIPASE: CPT

## 2025-05-05 PROCEDURE — 85025 COMPLETE CBC W/AUTO DIFF WBC: CPT

## 2025-05-05 PROCEDURE — 81001 URINALYSIS AUTO W/SCOPE: CPT

## 2025-05-05 PROCEDURE — 99284 EMERGENCY DEPT VISIT MOD MDM: CPT

## 2025-05-05 PROCEDURE — 700102 HCHG RX REV CODE 250 W/ 637 OVERRIDE(OP): Mod: UD | Performed by: STUDENT IN AN ORGANIZED HEALTH CARE EDUCATION/TRAINING PROGRAM

## 2025-05-05 PROCEDURE — 80053 COMPREHEN METABOLIC PANEL: CPT

## 2025-05-05 PROCEDURE — A9270 NON-COVERED ITEM OR SERVICE: HCPCS | Mod: UD | Performed by: STUDENT IN AN ORGANIZED HEALTH CARE EDUCATION/TRAINING PROGRAM

## 2025-05-05 PROCEDURE — 84703 CHORIONIC GONADOTROPIN ASSAY: CPT

## 2025-05-05 PROCEDURE — 36415 COLL VENOUS BLD VENIPUNCTURE: CPT

## 2025-05-05 PROCEDURE — RXMED WILLOW AMBULATORY MEDICATION CHARGE: Performed by: STUDENT IN AN ORGANIZED HEALTH CARE EDUCATION/TRAINING PROGRAM

## 2025-05-05 RX ORDER — DICYCLOMINE HCL 20 MG
20 TABLET ORAL ONCE
Status: COMPLETED | OUTPATIENT
Start: 2025-05-05 | End: 2025-05-05

## 2025-05-05 RX ORDER — METOCLOPRAMIDE 10 MG/1
10 TABLET ORAL ONCE
Status: COMPLETED | OUTPATIENT
Start: 2025-05-05 | End: 2025-05-05

## 2025-05-05 RX ORDER — DICYCLOMINE HCL 20 MG
20 TABLET ORAL EVERY 6 HOURS PRN
Qty: 30 TABLET | Refills: 0 | Status: SHIPPED | OUTPATIENT
Start: 2025-05-05

## 2025-05-05 RX ORDER — METOCLOPRAMIDE 10 MG/1
10 TABLET ORAL 3 TIMES DAILY PRN
Qty: 30 TABLET | Refills: 0 | Status: SHIPPED | OUTPATIENT
Start: 2025-05-05

## 2025-05-05 RX ADMIN — DICYCLOMINE HYDROCHLORIDE 20 MG: 20 TABLET ORAL at 11:25

## 2025-05-05 RX ADMIN — METOCLOPRAMIDE 10 MG: 10 TABLET ORAL at 11:25

## 2025-05-05 ASSESSMENT — FIBROSIS 4 INDEX: FIB4 SCORE: 0.48

## 2025-05-05 NOTE — ED PROVIDER NOTES
CHIEF COMPLAINT  Chief Complaint   Patient presents with    Sent by MD     Seen here 6 days ago, started on abx for BV infection and yeast infection,  pt reports those symptoms have improved, but been having lower abd, seen at GI and dx lower abd infection by GI and supposed to start on medications but hasn't started them d/t increased price on medications.         LIMITATION TO HISTORY   Select: None    HPI    Yane Orellana is a 39 y.o. female who presents to the Emergency Department for evaluation of generalized abdominal pain.  Patient does have a history of chronic abdominal pain and states this pain has been ongoing for months.  Is following gastroenterology, stated that she was recently started on an unknown medication though was unable to go pick it up due to cost reasons.  Stated that she is unable to follow-up with gastroenterology for the next 2 weeks.  She has had some nausea no vomiting no diarrhea no black tarry stools bloody stools no increasing urinary frequency urgency.    OUTSIDE HISTORIAN(S):  Select: None    EXTERNAL RECORDS REVIEWED  Select: Other urgent care note from yesterday seen for bacterial vaginosis, was prescribed fluconazole, as well as clindamycin.      PAST MEDICAL HISTORY  Past Medical History:   Diagnosis Date    ASTHMA 2006    Chlamydia 2010    Fall 2010    1 week ago fell off a horse    Migraine     Other specified symptom associated with female genital organs      ab2    Psychiatric disorder 2008    Borderline personality disorder    Psychiatric problem     Bipolar and schizophrenia in family    Recurrent UTI     Schizoaffective disorder (HCC)     Substance abuse (HCC)     Urinary tract infection     Recurrent UTI's 6-9 per year    Vaginal yeast infections      .    SURGICAL HISTORY  Past Surgical History:   Procedure Laterality Date    GYN SURGERY      abortions x2         FAMILY HISTORY  Family History   Problem Relation Age of Onset    Diabetes  Mother     Psychiatric Illness Mother     Diabetes Maternal Grandmother     Diabetes Maternal Grandfather           SOCIAL HISTORY  Social History     Socioeconomic History    Marital status: Single     Spouse name: Not on file    Number of children: Not on file    Years of education: Not on file    Highest education level: Not on file   Occupational History    Not on file   Tobacco Use    Smoking status: Former     Current packs/day: 0.00     Types: Cigarettes     Quit date: 2018     Years since quittin.6    Smokeless tobacco: Never   Vaping Use    Vaping status: Former    Substances: Nicotine   Substance and Sexual Activity    Alcohol use: Not Currently     Comment: Occasionally    Drug use: Not Currently     Types: Marijuana     Comment: medical card     Sexual activity: Yes   Other Topics Concern    Not on file   Social History Narrative    ** Merged History Encounter **          Social Drivers of Health     Financial Resource Strain: Not on file   Food Insecurity: Not on file   Transportation Needs: Not on file   Physical Activity: Not on file   Stress: Not on file   Social Connections: Not on file   Intimate Partner Violence: Not on file   Housing Stability: Not on file         CURRENT MEDICATIONS  No current facility-administered medications on file prior to encounter.     Current Outpatient Medications on File Prior to Encounter   Medication Sig Dispense Refill    fluconazole (DIFLUCAN) 150 MG tablet Take one tablet orally for yeast infection, if symptoms persist, may repeat treatment after 72 hours. 2 Tablet 0    clindamycin (CLEOCIN) 2 % vaginal cream Insert 1 Applicator into the vagina every evening for 7 days. 40 g 0    ALPRAZolam (XANAX) 0.5 MG Tab  (Patient not taking: Reported on 2025)      Bismuth Subsalicylate 262 MG Tab Take 1-2 Tablets by mouth 3 times a day as needed (nausea/vomiting/diarrhea). (Patient not taking: Reported on 2025) 60 Tablet 0    loperamide (IMODIUM) 2 MG Cap  Take 1 Capsule by mouth 4 times a day as needed for Diarrhea (take only if other first line medications are not effective). (Patient not taking: Reported on 5/1/2025) 30 Capsule 0    ondansetron (ZOFRAN ODT) 4 MG TABLET DISPERSIBLE Take 1 Tablet by mouth every 6 hours as needed for Nausea/Vomiting for up to 15 doses. 15 Tablet 0    sucralfate (CARAFATE) 1 GM Tab Take 1 Tablet by mouth 3 times a day before meals. (Patient not taking: Reported on 5/1/2025) 90 Tablet 0    sumatriptan (IMITREX) 100 MG tablet TAKE 1 TABLET BY MOUTH AT ONSET OF HEADACHE (Patient not taking: Reported on 1/29/2025)      methylPREDNISolone (MEDROL DOSEPAK) 4 MG Tablet Therapy Pack Follow schedule on package instructions. (Patient not taking: Reported on 1/29/2025) 21 Tablet 0    cyclobenzaprine (FLEXERIL) 10 mg Tab Take 1 Tablet by mouth 3 times a day as needed for Moderate Pain. (Patient not taking: Reported on 1/29/2025) 30 Tablet 0    Lidocaine HCl 4 % Cream Apply 1 Application topically in the morning, at noon, and at bedtime. 80 mL 0    fluticasone (FLONASE) 50 MCG/ACT nasal spray 2 Sprays every day. (Patient not taking: Reported on 1/29/2025)      gabapentin (NEURONTIN) 100 MG Cap Take 200 mg by mouth 2 times a day. (Patient not taking: Reported on 5/1/2025)      propranolol (INDERAL) 20 MG Tab Take 20 mg by mouth 2 times a day as needed. (Patient not taking: Reported on 9/13/2024)      SUMAtriptan (IMITREX) 50 MG Tab Take 1 tab at initial onset of symptoms.  Take 2nd tab after 2 hrs if symptoms have not improved. Maximum dose: 100 mg per dose; 200 mg per 24 hours. (Patient not taking: Reported on 9/13/2024) 9 Tablet 0    albuterol 108 (90 Base) MCG/ACT Aero Soln inhalation aerosol Inhale 2 Puffs every 6 hours as needed for Shortness of Breath. (Patient not taking: Reported on 1/29/2025) 8.5 g 0    Acetaminophen (TYLENOL EXTRA STRENGTH PO) Take 1 Tablet by mouth 1 time a day as needed (PAIN). (Patient not taking: Reported on 1/29/2025)  "     multivitamin Tab Take 1 Tablet by mouth every day. (Patient not taking: Reported on 9/13/2024)             ALLERGIES  Allergies   Allergen Reactions    Geodon [Ziprasidone Hydrochloride] Anaphylaxis       PHYSICAL EXAM  VITAL SIGNS:/72   Pulse 82   Temp 36.6 °C (97.9 °F) (Temporal)   Resp 16   Ht 1.638 m (5' 4.5\")   Wt (!) 127 kg (279 lb 1.6 oz)   LMP 04/16/2025   SpO2 96%   BMI 47.17 kg/m²       VITALS - vital signs documented prior to this note have been reviewed and noted,  GENERAL - awake, alert, oriented, GCS 15, no apparent distress, non-toxic  appearing  HEENT - normocephalic, atraumatic, pupils equal, sclera anicteric, mucus  membranes moist  NECK - supple, no meningismus, full active range of motion, trachea midline  CARDIOVASCULAR - regular rate/rhythm, no murmurs/gallops/rubs  PULMONARY - no respiratory distress, speaking in full sentences, clear to  auscultation bilaterally, no wheezing/ronchi/rales, no accessory muscle use  GASTROINTESTINAL -bowel sounds present normal active negative Zepeda sign no McBurney's point tenderness no rebound guarding or peritonitis no CVA tenderness no palpable abdominal masses no overlying abrasions lesions or contusions  GENITOURINARY - Deferred  NEUROLOGIC - Awake alert, normal mental status, speech fluid, cognition  normal, moves all extremities  MUSCULOSKELETAL - no obvious asymmetry or deformities present  EXTREMITIES - warm, well-perfused, no cyanosis or significant edema  DERMATOLOGIC - warm, dry, no rashes, no jaundice  PSYCHIATRIC - normal affect, normal insight, normal concentration    DIAGNOSTIC STUDIES / PROCEDURES      LABS  Labs Reviewed   COMP METABOLIC PANEL - Abnormal; Notable for the following components:       Result Value    Glucose 116 (*)     AST(SGOT) 50 (*)     ALT(SGPT) 51 (*)     All other components within normal limits   URINALYSIS,CULTURE IF INDICATED - Abnormal; Notable for the following components:    Ketones Trace (*)     " Leukocyte Esterase Trace (*)     All other components within normal limits   URINE MICROSCOPIC (W/UA) - Abnormal; Notable for the following components:    RBC 3-5 (*)     Bacteria Rare (*)     All other components within normal limits   CBC WITH DIFFERENTIAL   LIPASE   HCG QUAL SERUM   ESTIMATED GFR       Liver enzymes are elevated may be due to underlying fatty liver disease        Radiologist interpretation:   No orders to display        COURSE & MEDICAL DECISION MAKING    ED COURSE:        INTERVENTIONS BY ME:  Medications   metoclopramide (Reglan) tablet 10 mg (10 mg Oral Given 5/5/25 1125)   dicyclomine (Bentyl) tablet 20 mg (20 mg Oral Given 5/5/25 1125)         INITIAL ASSESSMENT, COURSE AND PLAN  Care Narrative: Patient presents for evaluation of generalized abdominal discomfort, which she states that has been ongoing for months over the last over the past few weeks.  She is following gastroenterology for this.  She has had some nausea no vomiting no diarrhea no black tarry stools or bloody stools.  Is passing gas having regular bowel movements.  On examination no reproducible abdominal tenderness.  Workup was obtained did not show any significant leukocytosis or other significant metabolic derangements did feel slightly improved after Reglan and Bentyl.  Will discharge her on Reglan Bentyl encouraged her to follow-up with her gastroenterology and return with any other concerns.             ADDITIONAL PROBLEM LIST    DISPOSITION AND DISCUSSIONS    Escalation of care considered, and ultimately not performed:diagnostic imaging I do not see an indication that would warrant emergent CT abdomen pelvis at this time and believe the risks of radiation and contrast outweigh the benefits at this time.  I estimate there is low risk for acute appendicitis bowel obstruction acute cholecystitis ruptured diverticulitis incarcerated or strangulated hernia hemorrhagic pancreatitis perforated bowel or ulcer.  Shared decision  making conversation was had with the patient.  Based on the history and physical exam overall presentation is not indicative of an underlying acute surgical abdomen         Decision tools and prescription drugs considered including, but not limited to:  Regarnaldo Soto .    FINAL DIAGNOSIS  1. Generalized abdominal pain             Electronically signed by: Vinny Berumen DO ,1:12 PM 05/05/25

## 2025-05-05 NOTE — ED TRIAGE NOTES
Pt ambulated to triage with   Chief Complaint   Patient presents with    Sent by MD     Seen here 6 days ago, started on abx for BV infection and yeast infection,  pt reports those symptoms have improved, but been having lower abd, seen at GI and dx lower abd infection by GI and supposed to start on medications but hasn't started them d/t increased price on medications.       Report SIBO infection dx by her GI dr.   Protocol ordered.  Pt Informed regarding triage process and verbalized understanding to inform triage tech or RN for any changes in condition. Placed in lobby.

## 2025-07-14 ENCOUNTER — OFFICE VISIT (OUTPATIENT)
Dept: URGENT CARE | Facility: PHYSICIAN GROUP | Age: 40
End: 2025-07-14
Payer: COMMERCIAL

## 2025-07-14 VITALS
DIASTOLIC BLOOD PRESSURE: 70 MMHG | HEART RATE: 92 BPM | TEMPERATURE: 98.1 F | BODY MASS INDEX: 47.02 KG/M2 | WEIGHT: 282.19 LBS | RESPIRATION RATE: 16 BRPM | OXYGEN SATURATION: 95 % | HEIGHT: 65 IN | SYSTOLIC BLOOD PRESSURE: 124 MMHG

## 2025-07-14 DIAGNOSIS — B35.3 TINEA PEDIS OF BOTH FEET: ICD-10-CM

## 2025-07-14 DIAGNOSIS — S05.8X1A ABRASION OF SCLERA OF RIGHT EYE, INITIAL ENCOUNTER: Primary | ICD-10-CM

## 2025-07-14 PROCEDURE — 3074F SYST BP LT 130 MM HG: CPT | Performed by: NURSE PRACTITIONER

## 2025-07-14 PROCEDURE — 99213 OFFICE O/P EST LOW 20 MIN: CPT | Performed by: NURSE PRACTITIONER

## 2025-07-14 PROCEDURE — 3078F DIAST BP <80 MM HG: CPT | Performed by: NURSE PRACTITIONER

## 2025-07-14 RX ORDER — KETOCONAZOLE 20 MG/G
1 CREAM TOPICAL DAILY
Qty: 30 G | Refills: 0 | Status: SHIPPED | OUTPATIENT
Start: 2025-07-14 | End: 2025-07-17 | Stop reason: SDUPTHER

## 2025-07-14 RX ORDER — OFLOXACIN 3 MG/ML
1 SOLUTION/ DROPS OPHTHALMIC 4 TIMES DAILY
Qty: 5 ML | Refills: 0 | Status: SHIPPED | OUTPATIENT
Start: 2025-07-14 | End: 2025-07-21

## 2025-07-14 ASSESSMENT — ENCOUNTER SYMPTOMS
BLURRED VISION: 0
EYE REDNESS: 1

## 2025-07-14 ASSESSMENT — FIBROSIS 4 INDEX: FIB4 SCORE: 1.1

## 2025-07-14 NOTE — PROGRESS NOTES
Subjective     Yane Orellana is a 40 y.o. female who presents with Eye Problem (Pt states she ripped an eye contact on (R) eye x 3 weeks and states her eye has been red since then. Pt also concern of athletes feet. )            Eye Problem   The right eye is affected. This is a new problem. Episode onset: pt reports she removed her contact from her right eye about 3 weeks ago. admits when she removed her contact, it ripped, causing it to scratch against her eye. since then she has experienced redness, itchiness and some weeping from her eye. The problem has been unchanged. There is No known exposure to pink eye. She Wears contacts. Associated symptoms include eye redness. Pertinent negatives include no blurred vision. She has tried nothing for the symptoms.   Foot Problem  This is a new problem. Episode onset: pt reports she has been trying to treat her athletes foot with OTC medications for 8 months. does not think it is helping, continue to suffer from cracks to her heals. Treatments tried: OTC topical athletes foot cream, filing and pummicing her feet almost daily. The treatment provided no relief.       Review of Systems   Eyes:  Positive for redness. Negative for blurred vision.   Skin:         Athletes foot bilaterally   All other systems reviewed and are negative.           Past Medical History[1] Past Surgical History[2]   Social History     Socioeconomic History    Marital status: Single     Spouse name: Not on file    Number of children: Not on file    Years of education: Not on file    Highest education level: Not on file   Occupational History    Not on file   Tobacco Use    Smoking status: Former     Current packs/day: 0.00     Types: Cigarettes     Quit date: 2018     Years since quittin.8    Smokeless tobacco: Never   Vaping Use    Vaping status: Former    Substances: Nicotine   Substance and Sexual Activity    Alcohol use: Not Currently     Comment: Occasionally    Drug use: Not  "Currently     Types: Marijuana     Comment: medical card     Sexual activity: Yes   Other Topics Concern    Not on file   Social History Narrative    ** Merged History Encounter **          Social Drivers of Health     Financial Resource Strain: Not on file   Food Insecurity: Not on file   Transportation Needs: Not on file   Physical Activity: Not on file   Stress: Not on file   Social Connections: Not on file   Intimate Partner Violence: Not on file   Housing Stability: Not on file       Objective     /70 (BP Location: Left arm, Patient Position: Sitting, BP Cuff Size: Adult long)   Pulse 92   Temp 36.7 °C (98.1 °F) (Temporal)   Resp 16   Ht 1.638 m (5' 4.5\")   Wt (!) 128 kg (282 lb 3 oz)   SpO2 95%   BMI 47.69 kg/m²      Physical Exam  Vitals and nursing note reviewed.   Constitutional:       Appearance: Normal appearance. She is normal weight.   HENT:      Head: Normocephalic and atraumatic.      Nose: Nose normal.      Mouth/Throat:      Mouth: Mucous membranes are moist.      Pharynx: Oropharynx is clear.   Eyes:      Extraocular Movements: Extraocular movements intact.      Conjunctiva/sclera:      Right eye: Right conjunctiva is injected.      Pupils: Pupils are equal, round, and reactive to light.      Right eye: Fluorescein uptake present. No corneal abrasion.     Cardiovascular:      Rate and Rhythm: Normal rate and regular rhythm.   Pulmonary:      Effort: Pulmonary effort is normal.      Breath sounds: Normal breath sounds.   Musculoskeletal:         General: Normal range of motion.      Cervical back: Normal range of motion.        Feet:    Skin:     General: Skin is warm and dry.      Capillary Refill: Capillary refill takes less than 2 seconds.   Neurological:      General: No focal deficit present.      Mental Status: She is alert and oriented to person, place, and time. Mental status is at baseline.   Psychiatric:         Mood and Affect: Mood normal.         Speech: Speech normal.    "      Thought Content: Thought content normal.         Judgment: Judgment normal.                                  Assessment & Plan  Abrasion of sclera of right eye, initial encounter    Orders:    ofloxacin (OCUFLOX) 0.3 % Solution; Administer 1 Drop into the right eye 4 times a day for 7 days.    Tinea pedis of both feet    Orders:    ketoconazole (NIZORAL) 2 % Cream; Apply 1 Application topically every day.       Use eye drops as directed  Do not wear contacts for one week  Can use warm compresses for eye irritation relief  Please follow up with optho and PCP  Supportive care, differential diagnoses, and indications for immediate follow-up discussed with patient.    Pathogenesis of diagnosis discussed including typical length and natural progression.    Instructed to return to  or nearest emergency department if symptoms fail to improve, for any change in condition, further concerns, or new concerning symptoms.  Patient states understanding of the plan of care and discharge instructions.                 [1]   Past Medical History:  Diagnosis Date    ASTHMA 2006    Chlamydia 2010    Fall 2010    1 week ago fell off a horse    Migraine     Other specified symptom associated with female genital organs      ab2    Psychiatric disorder     Borderline personality disorder    Psychiatric problem     Bipolar and schizophrenia in family    Recurrent UTI 2010    Schizoaffective disorder (HCC)     Substance abuse (HCC)     Urinary tract infection     Recurrent UTI's 6-9 per year    Vaginal yeast infections    [2]   Past Surgical History:  Procedure Laterality Date    GYN SURGERY      abortions x2

## 2025-07-17 ENCOUNTER — TELEPHONE (OUTPATIENT)
Dept: URGENT CARE | Facility: PHYSICIAN GROUP | Age: 40
End: 2025-07-17
Payer: COMMERCIAL

## 2025-07-17 DIAGNOSIS — B35.3 TINEA PEDIS OF BOTH FEET: ICD-10-CM

## 2025-07-17 RX ORDER — KETOCONAZOLE 20 MG/G
1 CREAM TOPICAL DAILY
Qty: 30 G | Refills: 1 | Status: SHIPPED | OUTPATIENT
Start: 2025-07-17 | End: 2025-07-17 | Stop reason: SDUPTHER

## 2025-07-17 NOTE — TELEPHONE ENCOUNTER
Called this pt. back and confirmed her Rx was sent by a different provider in clinic. Confirmed w/ pharmacy that Rx was sent and they approved valid SIG. Pt. will  Rx when they return to state. No further questions or concerns at this time.

## 2025-07-17 NOTE — TELEPHONE ENCOUNTER
Called pt. back and confirmed that I attempted to alter Rx so she can get it filled. I was unable to make any changes and need the provider to esmer w/ pharmacy. I have messaged the provider and advised pt. to wait for 24-72 hours for reply. No further questions or concerns at this time. Patient understood and waiting for provider reply/ update.

## 2025-07-17 NOTE — PROGRESS NOTES
Adjusting patient's ketoconazole prescription after pharmacy requesting additional directions prior to filling medication

## 2025-08-07 ENCOUNTER — OFFICE VISIT (OUTPATIENT)
Dept: URGENT CARE | Facility: PHYSICIAN GROUP | Age: 40
End: 2025-08-07
Payer: COMMERCIAL

## 2025-08-07 VITALS
HEIGHT: 65 IN | BODY MASS INDEX: 48.12 KG/M2 | OXYGEN SATURATION: 96 % | WEIGHT: 288.8 LBS | HEART RATE: 80 BPM | DIASTOLIC BLOOD PRESSURE: 74 MMHG | SYSTOLIC BLOOD PRESSURE: 108 MMHG | RESPIRATION RATE: 16 BRPM | TEMPERATURE: 98.1 F

## 2025-08-07 DIAGNOSIS — H04.129 DRY EYE: ICD-10-CM

## 2025-08-07 DIAGNOSIS — H10.31 ACUTE BACTERIAL CONJUNCTIVITIS OF RIGHT EYE: Primary | ICD-10-CM

## 2025-08-07 DIAGNOSIS — S05.01XD ABRASION OF RIGHT CORNEA, SUBSEQUENT ENCOUNTER: ICD-10-CM

## 2025-08-07 PROCEDURE — 99214 OFFICE O/P EST MOD 30 MIN: CPT | Performed by: FAMILY MEDICINE

## 2025-08-07 PROCEDURE — 3078F DIAST BP <80 MM HG: CPT | Performed by: FAMILY MEDICINE

## 2025-08-07 PROCEDURE — 3074F SYST BP LT 130 MM HG: CPT | Performed by: FAMILY MEDICINE

## 2025-08-07 RX ORDER — GENTAMICIN SULFATE 3 MG/ML
1 SOLUTION/ DROPS OPHTHALMIC 4 TIMES DAILY
Qty: 5 ML | Refills: 0 | Status: SHIPPED | OUTPATIENT
Start: 2025-08-07

## 2025-08-07 RX ORDER — METRONIDAZOLE 250 MG/1
250 TABLET ORAL 3 TIMES DAILY
COMMUNITY
Start: 2025-08-05

## 2025-08-07 ASSESSMENT — FIBROSIS 4 INDEX: FIB4 SCORE: 1.1
